# Patient Record
Sex: MALE | Race: WHITE | NOT HISPANIC OR LATINO
[De-identification: names, ages, dates, MRNs, and addresses within clinical notes are randomized per-mention and may not be internally consistent; named-entity substitution may affect disease eponyms.]

---

## 2020-03-06 ENCOUNTER — APPOINTMENT (OUTPATIENT)
Dept: FAMILY MEDICINE | Facility: CLINIC | Age: 20
End: 2020-03-06
Payer: COMMERCIAL

## 2020-03-06 VITALS
OXYGEN SATURATION: 98 % | HEIGHT: 71.5 IN | WEIGHT: 215 LBS | SYSTOLIC BLOOD PRESSURE: 120 MMHG | RESPIRATION RATE: 16 BRPM | TEMPERATURE: 98 F | BODY MASS INDEX: 29.44 KG/M2 | HEART RATE: 84 BPM | DIASTOLIC BLOOD PRESSURE: 82 MMHG

## 2020-03-06 DIAGNOSIS — L70.9 ACNE, UNSPECIFIED: ICD-10-CM

## 2020-03-06 DIAGNOSIS — Z00.00 ENCOUNTER FOR GENERAL ADULT MEDICAL EXAMINATION W/OUT ABNORMAL FINDINGS: ICD-10-CM

## 2020-03-06 DIAGNOSIS — Z84.0 FAMILY HISTORY OF DISEASES OF THE SKIN AND SUBCUTANEOUS TISSUE: ICD-10-CM

## 2020-03-06 DIAGNOSIS — Z80.9 FAMILY HISTORY OF MALIGNANT NEOPLASM, UNSPECIFIED: ICD-10-CM

## 2020-03-06 DIAGNOSIS — R94.5 ABNORMAL RESULTS OF LIVER FUNCTION STUDIES: ICD-10-CM

## 2020-03-06 DIAGNOSIS — Z87.39 PERSONAL HISTORY OF OTHER DISEASES OF THE MUSCULOSKELETAL SYSTEM AND CONNECTIVE TISSUE: ICD-10-CM

## 2020-03-06 DIAGNOSIS — Z82.61 FAMILY HISTORY OF ARTHRITIS: ICD-10-CM

## 2020-03-06 PROCEDURE — 99395 PREV VISIT EST AGE 18-39: CPT

## 2020-03-06 NOTE — PHYSICAL EXAM
[No Acute Distress] : no acute distress [Normal Sclera/Conjunctiva] : normal sclera/conjunctiva [Normal Oropharynx] : the oropharynx was normal [Normal TMs] : both tympanic membranes were normal [No Lymphadenopathy] : no lymphadenopathy [Thyroid Normal, No Nodules] : the thyroid was normal and there were no nodules present [No Accessory Muscle Use] : no accessory muscle use [Clear to Auscultation] : lungs were clear to auscultation bilaterally [Normal Rate] : normal rate  [Regular Rhythm] : with a regular rhythm [Normal S1, S2] : normal S1 and S2 [Soft] : abdomen soft [Non Tender] : non-tender [Non-distended] : non-distended [No HSM] : no HSM [Normal Supraclavicular Nodes] : no supraclavicular lymphadenopathy [Normal Posterior Cervical Nodes] : no posterior cervical lymphadenopathy [Normal Anterior Cervical Nodes] : no anterior cervical lymphadenopathy [No Spinal Tenderness] : no spinal tenderness [No Joint Swelling] : no joint swelling [Grossly Normal Strength/Tone] : grossly normal strength/tone [No Focal Deficits] : no focal deficits [Normal Gait] : normal gait [Deep Tendon Reflexes (DTR)] : deep tendon reflexes were 2+ and symmetric [Alert and Oriented x3] : oriented to person, place, and time [Normal Insight/Judgement] : insight and judgment were intact [de-identified] : calm and pleasant  [de-identified] : o [de-identified] : mild facial acne  NODULE  LEFT CHEEK  anticipating excison

## 2020-03-06 NOTE — HISTORY OF PRESENT ILLNESS
[FreeTextEntry1] : New patient here to establish care.\par Informed by dermatologist of elevated LFT's.\par Amoxicillin allergy\par [facility] [de-identified] : Mr. JIMMIE REYES presents today to establish care being referred to me by his his parents  also a patient  in [place] \par He is an affable 19 year old male with PMH significant for mildly elevated LE while taking  Acutane , month 2. \par \par PSH significant for  NONE \par \par Denies any recent ER visits/hospitalizations/ MVA's or MSK injuries. \par \par Providers:  Dermatologist  Dr. 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

## 2020-03-06 NOTE — REVIEW OF SYSTEMS
[Negative] : Psychiatric [Fever] : no fever [Chills] : no chills [Fatigue] : no fatigue [Night Sweats] : no night sweats [FreeTextEntry3] : eyeglasses

## 2020-03-06 NOTE — HEALTH RISK ASSESSMENT
[Good] : ~his/her~ current health as good [Very Good] : ~his/her~  mood as very good [Yes] : Yes [Monthly or less (1 pt)] : Monthly or less (1 point) [1 or 2 (0 pts)] : 1 or 2 (0 points) [Never (0 pts)] : Never (0 points) [No] : In the past 12 months have you used drugs other than those required for medical reasons? No [0] : 2) Feeling down, depressed, or hopeless: Not at all (0) [FreeTextEntry1] : Establish care and address elevated LFT's [] : No [de-identified] : DENIES [Audit-CScore] : 1 [[id number]] : 0

## 2020-03-09 ENCOUNTER — APPOINTMENT (OUTPATIENT)
Dept: ULTRASOUND IMAGING | Facility: CLINIC | Age: 20
End: 2020-03-09
Payer: COMMERCIAL

## 2020-03-09 PROCEDURE — 76700 US EXAM ABDOM COMPLETE: CPT

## 2021-02-12 ENCOUNTER — OUTPATIENT (OUTPATIENT)
Dept: OUTPATIENT SERVICES | Facility: HOSPITAL | Age: 21
LOS: 1 days | Discharge: HOME | End: 2021-02-12

## 2021-02-12 DIAGNOSIS — Z15.89 GENETIC SUSCEPTIBILITY TO OTHER DISEASE: ICD-10-CM

## 2021-02-16 ENCOUNTER — APPOINTMENT (OUTPATIENT)
Dept: HEMATOLOGY ONCOLOGY | Facility: CLINIC | Age: 21
End: 2021-02-16

## 2021-03-22 ENCOUNTER — OUTPATIENT (OUTPATIENT)
Dept: OUTPATIENT SERVICES | Facility: HOSPITAL | Age: 21
LOS: 1 days | Discharge: HOME | End: 2021-03-22

## 2021-03-22 DIAGNOSIS — Z15.89 GENETIC SUSCEPTIBILITY TO OTHER DISEASE: ICD-10-CM

## 2021-03-23 ENCOUNTER — APPOINTMENT (OUTPATIENT)
Dept: HEMATOLOGY ONCOLOGY | Facility: CLINIC | Age: 21
End: 2021-03-23
Payer: COMMERCIAL

## 2021-03-23 ENCOUNTER — APPOINTMENT (OUTPATIENT)
Dept: HEMATOLOGY ONCOLOGY | Facility: CLINIC | Age: 21
End: 2021-03-23

## 2021-03-23 PROCEDURE — 99204 OFFICE O/P NEW MOD 45 MIN: CPT | Mod: 95

## 2021-03-31 ENCOUNTER — APPOINTMENT (OUTPATIENT)
Dept: TRANSGENDER CARE | Facility: CLINIC | Age: 21
End: 2021-03-31

## 2021-03-31 ENCOUNTER — NON-APPOINTMENT (OUTPATIENT)
Age: 21
End: 2021-03-31

## 2021-03-31 ENCOUNTER — TRANSCRIPTION ENCOUNTER (OUTPATIENT)
Age: 21
End: 2021-03-31

## 2021-04-12 ENCOUNTER — APPOINTMENT (OUTPATIENT)
Dept: TRANSGENDER CARE | Facility: CLINIC | Age: 21
End: 2021-04-12
Payer: COMMERCIAL

## 2021-04-12 VITALS
TEMPERATURE: 98.1 F | SYSTOLIC BLOOD PRESSURE: 110 MMHG | BODY MASS INDEX: 25.9 KG/M2 | DIASTOLIC BLOOD PRESSURE: 68 MMHG | HEART RATE: 107 BPM | WEIGHT: 185 LBS | OXYGEN SATURATION: 98 % | HEIGHT: 71 IN

## 2021-04-12 DIAGNOSIS — Z11.59 ENCOUNTER FOR SCREENING FOR OTHER VIRAL DISEASES: ICD-10-CM

## 2021-04-12 DIAGNOSIS — Z11.3 ENCOUNTER FOR SCREENING FOR INFECTIONS WITH A PREDOMINANTLY SEXUAL MODE OF TRANSMISSION: ICD-10-CM

## 2021-04-12 DIAGNOSIS — Z11.4 ENCOUNTER FOR SCREENING FOR HUMAN IMMUNODEFICIENCY VIRUS [HIV]: ICD-10-CM

## 2021-04-12 DIAGNOSIS — Z78.9 OTHER SPECIFIED HEALTH STATUS: ICD-10-CM

## 2021-04-12 PROCEDURE — 36415 COLL VENOUS BLD VENIPUNCTURE: CPT

## 2021-04-12 PROCEDURE — 99205 OFFICE O/P NEW HI 60 MIN: CPT | Mod: 25

## 2021-04-12 PROCEDURE — 99072 ADDL SUPL MATRL&STAF TM PHE: CPT

## 2021-04-12 PROCEDURE — XXXXX: CPT

## 2021-04-12 RX ORDER — IBUPROFEN 800 MG/1
TABLET, FILM COATED ORAL
Refills: 0 | Status: ACTIVE | COMMUNITY

## 2021-04-12 RX ORDER — ISOTRETINOIN 30 MG/1
CAPSULE, GELATIN COATED ORAL
Refills: 0 | Status: DISCONTINUED | COMMUNITY
End: 2021-04-12

## 2021-04-12 RX ORDER — ELECTROLYTES/DEXTROSE
SOLUTION, ORAL ORAL
Refills: 0 | Status: ACTIVE | COMMUNITY

## 2021-04-12 NOTE — SOCIAL HISTORY
[Sexually Active] : The patient is sexually active [Always] : always [Vaginal] : vaginal [Female ___] : [unfilled] female [Date of most recent sexual encounter ___] : ~His/Her~ most recent sexual encounter was [unfilled] [Partnership for Health – Safe Sex Evidence Based Intervention] : The Partnership for Health Safe Sex Evidence Based Intervention was applied [Positive Reinforcement of Safe Behavior Message] : and a positive reinforcement of safe behavior message was provided.

## 2021-04-15 LAB
ALBUMIN SERPL ELPH-MCNC: 5.1 G/DL
ALP BLD-CCNC: 59 U/L
ALT SERPL-CCNC: 33 U/L
AMYLASE/CREAT SERPL: 72 U/L
ANION GAP SERPL CALC-SCNC: 12 MMOL/L
APPEARANCE: CLEAR
AST SERPL-CCNC: 20 U/L
BASOPHILS # BLD AUTO: 0.06 K/UL
BASOPHILS NFR BLD AUTO: 1.2 %
BILIRUB SERPL-MCNC: 0.4 MG/DL
BILIRUBIN URINE: NEGATIVE
BLOOD URINE: NEGATIVE
BUN SERPL-MCNC: 13 MG/DL
C TRACH RRNA SPEC QL NAA+PROBE: NOT DETECTED
CALCIUM SERPL-MCNC: 10.2 MG/DL
CHLORIDE SERPL-SCNC: 103 MMOL/L
CHOLEST SERPL-MCNC: 129 MG/DL
CO2 SERPL-SCNC: 27 MMOL/L
COLOR: NORMAL
CREAT SERPL-MCNC: 0.94 MG/DL
EOSINOPHIL # BLD AUTO: 0.25 K/UL
EOSINOPHIL NFR BLD AUTO: 5.1 %
ESTIMATED AVERAGE GLUCOSE: 103 MG/DL
ESTRADIOL SERPL-MCNC: 11 PG/ML
FSH SERPL-MCNC: 6.1 IU/L
GLUCOSE QUALITATIVE U: NEGATIVE
GLUCOSE SERPL-MCNC: 76 MG/DL
HBA1C MFR BLD HPLC: 5.2 %
HBV CORE IGG+IGM SER QL: NONREACTIVE
HBV SURFACE AG SER QL: NONREACTIVE
HCT VFR BLD CALC: 48.6 %
HCV AB SER QL: NONREACTIVE
HCV S/CO RATIO: 0.09 S/CO
HDLC SERPL-MCNC: 50 MG/DL
HEPATITIS A IGG ANTIBODY: NONREACTIVE
HGB BLD-MCNC: 14.8 G/DL
IMM GRANULOCYTES NFR BLD AUTO: 0.2 %
KETONES URINE: NEGATIVE
LDLC SERPL CALC-MCNC: 71 MG/DL
LEUKOCYTE ESTERASE URINE: NEGATIVE
LH SERPL-ACNC: 6.8 IU/L
LPL SERPL-CCNC: 27 U/L
LYMPHOCYTES # BLD AUTO: 1.7 K/UL
LYMPHOCYTES NFR BLD AUTO: 34.7 %
MAN DIFF?: NORMAL
MCHC RBC-ENTMCNC: 29.4 PG
MCHC RBC-ENTMCNC: 30.5 GM/DL
MCV RBC AUTO: 96.6 FL
MONOCYTES # BLD AUTO: 0.49 K/UL
MONOCYTES NFR BLD AUTO: 10 %
N GONORRHOEA RRNA SPEC QL NAA+PROBE: NOT DETECTED
NEUTROPHILS # BLD AUTO: 2.39 K/UL
NEUTROPHILS NFR BLD AUTO: 48.8 %
NITRITE URINE: NEGATIVE
NONHDLC SERPL-MCNC: 79 MG/DL
PH URINE: 6.5
PLATELET # BLD AUTO: 261 K/UL
POTASSIUM SERPL-SCNC: 4.7 MMOL/L
PROLACTIN SERPL-MCNC: 13.2 NG/ML
PROT SERPL-MCNC: 7.8 G/DL
PROTEIN URINE: NEGATIVE
PSA SERPL-MCNC: 1 NG/ML
RBC # BLD: 5.03 M/UL
RBC # FLD: 12.5 %
SHBG SERPL-SCNC: 22.5 NMOL/L
SODIUM SERPL-SCNC: 142 MMOL/L
SOURCE AMPLIFICATION: NORMAL
SPECIFIC GRAVITY URINE: 1.01
TESTOST SERPL-MCNC: 559 NG/DL
TRIGL SERPL-MCNC: 41 MG/DL
TSH SERPL-ACNC: 1.76 UIU/ML
UROBILINOGEN URINE: NORMAL
WBC # FLD AUTO: 4.9 K/UL

## 2021-04-26 LAB
25(OH)D3 SERPL-MCNC: 20.2 NG/ML
HBV SURFACE AB SERPL IA-ACNC: <3 MIU/ML

## 2021-05-27 ENCOUNTER — APPOINTMENT (OUTPATIENT)
Dept: TRANSGENDER CARE | Facility: CLINIC | Age: 21
End: 2021-05-27
Payer: COMMERCIAL

## 2021-05-27 VITALS
HEART RATE: 80 BPM | WEIGHT: 183 LBS | BODY MASS INDEX: 25.62 KG/M2 | TEMPERATURE: 98.2 F | DIASTOLIC BLOOD PRESSURE: 62 MMHG | SYSTOLIC BLOOD PRESSURE: 118 MMHG | RESPIRATION RATE: 14 BRPM | OXYGEN SATURATION: 99 % | HEIGHT: 71 IN

## 2021-05-27 PROCEDURE — 99072 ADDL SUPL MATRL&STAF TM PHE: CPT

## 2021-05-27 PROCEDURE — 99205 OFFICE O/P NEW HI 60 MIN: CPT

## 2021-05-27 NOTE — ASSESSMENT
[FreeTextEntry1] : 21 y/o transfemale here for initial hormone affirming transition consultation. Discussed the feminization process involving estrogen with spironolactone. Discussed physical changes such as breast growth, softer skin, body fat redistribution, less erections etc with timeline ranging from 3-6 months initial onset to maximal benefits in 1-2 years. Discussed potential risks of transaminitis, CAD and thromboembolic disease especially with tobacco use. Also need to monitor for risk of breast cancer given BRCA +. I discussed with the patient at length the limited data on risk of breast cancer with hormone affirming therapy especially with strong risk factors such as this. Pt. aware and consents to estrogen preferring oral estrogen. Will need to monitor BP, LFTs, and potassium on hormones. No plans for top or bottom surgery at this time. Psych and mental health follow-up recommended. Will need age-appropriate health care and cancer screening maintenance over the years with testicular exam, mammogram or US, colonoscopy, prostate exam. Pt. not interested in sperm banking. Will start estradiol 1mg BID and spironolactone 100mg BID with dose adjustment as needed based on levels in 2 months.\par \par Prep time with previsit notes, review of labs and interval progress notes and consultations 15 min\par Discussion with patient regarding new hormone regimen with management plan, risks and benefits, treatment options and goals of care answering all patients questions and addressing all concerns 35 min\par Post-visit completion, consultation, charting and review 10 min\par Total Time 60 min\par \par Specifically causes, evaluation, treatment options, risks, complications, and benefits of available therapies were discussed. Questions were answered.\par \par The submitted E/M billing level for this visit reflects the total time spent on the day of the visit including face-to-face time spent with the patient, non-face-to-face review of medical records and relevant information, documentation, and asynchronous communication with the patient after a visit via phone, email, or patient’s EHR portal after the visit. \par The medical records reviewed are either scanned into the chart or reviewed with the patient using a patient’s electronic medical records portal for patients with records not available to [facility] via electronic transmission platforms from other institutions and labs. \par Time spend counseling and performing coordination of care was also included in determining the appropriate EM billing level.\par \par I have reviewed and verified information regarding the chief complaint and history recorded by the ancillary staff and/or the patient. I have independently reviewed and interpreted tests performed by other physicians and facilities as necessary. \par \par I have discussed with the patient differential diagnosis, reason for auxiliary tests if ordered, risks, benefits, alternatives, and complications of each form of therapy were discussed. \par \par \par

## 2021-05-27 NOTE — REASON FOR VISIT
[Initial Evaluation] : an initial evaluation [Transgender Care] : transgender care [FreeTextEntry2] : Dr. Miguel Reeder

## 2021-05-27 NOTE — HISTORY OF PRESENT ILLNESS
[FreeTextEntry1] : 20 year old, transmale here for hormone affirming therapy consultation\par \par Preferred Pronouns: he/him (for now)\par Sexual orientation: heterosexual. \par Gender identity: \par Assigned male at birth\par Specific Terms for Body Parts:\par Call pt: Brian\par • Name Change + Gender Marker: Not a priority. Still exploring. \par Cleared for hormones by Pearl Perry\par \par \par Coming out/Family support:\par • Transition HX + Present Life: Brian tells having qualms about his assigned gender at birth since around 2010 but did not focus too much on it. In June/July 2020 he began seriously researching this issue more. He recalls, speaking to a therapist once about his gender identity a few year ago but it did not go beyond that. In 2020 he started participating in online trans forums and had an “epiphany” that he is female. He came out to his family and friends. His family (mom/step dad) is working on understanding but overall, he feels safe and supported at home. He lives with his mother, stepfather, and younger brother. He maintains a good relationship with his biological father, and he is supportive but he's still a bit confused. Pt has a good relationship with his brother and older sister (does not live home). He has food security and reliable transportation. \par \par \par • Mental Health: Reports anxiety and gender dysphoria. In care with Pearl Perry LCSW ([phone]) since early 2021. He attends weekly in person sessions. No psychiatric admissions. No psychiatric medications. No sleeping problems. No SI or plans to self-harm. No hx of violence. Not interested in virtual support resources. \par \par • Endocrinology: No hx of puberty blockers or gender affirming hormone therapy. Interested in full feminization. Mostly wants improved mood and body fat redistribution. No genital dysphoria. Has gender dysphoria with overall appearance. Breasts are important, as well as the physique and hair/makeup/outfit. Pt feels that estrogen will be very important to him.\par Not tucking\par Not interested in sperm banking.\par No erectile dysfunction. Not sexually active. \par No interest in gender affirming surgeries at this time. Maybe bottom surgery in the future. \par No tobacco use\par No headaches, changes in vision, nausea, vomiting, chest pain, SOB, palpitations, LE swelling or calf pain\par No hx of thromboembolic disease or liver disorders. \par Depression and anxiety stable. No suicidal ideations or plans. \par \par Genetic Counseling: Shared his mother had breast cancer and he inherited the BRCA2 gene. In care with a genetic counselor, Kranthi Izaguirre ([phone]), [facility], [facility]. \par Mom - Breast cancer age 53 y/o -

## 2021-05-27 NOTE — REVIEW OF SYSTEMS
[All other systems negative] : All other systems negative [Fatigue] : no fatigue [Recent Weight Gain (___ Lbs)] : no recent weight gain [Recent Weight Loss (___ Lbs)] : no recent weight loss [Visual Field Defect] : no visual field defect [Dysphagia] : no dysphagia [Dysphonia] : no dysphonia [Chest Pain] : no chest pain [Palpitations] : no palpitations [Shortness Of Breath] : no shortness of breath [Nausea] : no nausea [Vomiting] : no vomiting [Polyuria] : no polyuria [Joint Pain] : no joint pain [Headaches] : no headaches [Tremors] : no tremors [Cold Intolerance] : no cold intolerance [Heat Intolerance] : no heat intolerance

## 2021-05-27 NOTE — PHYSICAL EXAM
[Alert] : alert [Well Nourished] : well nourished [Healthy Appearance] : healthy appearance [No Acute Distress] : no acute distress [Well Developed] : well developed [No Proptosis] : no proptosis [Supple] : the neck was supple [Thyroid Not Enlarged] : the thyroid was not enlarged [No Thyroid Nodules] : no palpable thyroid nodules [No Respiratory Distress] : no respiratory distress [No Accessory Muscle Use] : no accessory muscle use [Normal Rate and Effort] : normal respiratory rate and effort [Clear to Auscultation] : lungs were clear to auscultation bilaterally [Normal S1, S2] : normal S1 and S2 [Normal Rate] : heart rate was normal [Regular Rhythm] : with a regular rhythm [No Edema] : no peripheral edema [Not Tender] : non-tender [Not Distended] : not distended [Soft] : abdomen soft [No Stigmata of Cushings Syndrome] : no stigmata of Cushings Syndrome [No Clubbing, Cyanosis] : no clubbing  or cyanosis of the fingernails [Normal Strength/Tone] : muscle strength and tone were normal [Oriented x3] : oriented to person, place, and time [Normal Affect] : the affect was normal [Normal Mood] : the mood was normal [Kyphosis] : no kyphosis present

## 2021-05-27 NOTE — CONSULT LETTER
[Dear  ___] : Dear ~CHRIS, [Consult Letter:] : I had the pleasure of evaluating your patient, [unfilled]. [Please see my note below.] : Please see my note below. [Consult Closing:] : Thank you very much for allowing me to participate in the care of this patient.  If you have any questions, please do not hesitate to contact me. [Sincerely,] : Sincerely, [FreeTextEntry3] : Tiburcio Stuart DO\par Division of Endocrinology\par [facility]\par [facility]\par  of Medicine\par [facility] [employer]\par Director of [facility] [FreeTextEntry2] : Dr. Miguel Reeder\par [address]\par [place] [id number]

## 2021-05-27 NOTE — DATA REVIEWED
[FreeTextEntry1] : Labs 4/2021:\par CBC normal\par A1c 5.2%\par Estradiol 11\par Testosterone 559\par SHBG 22.5\par FSH 6.1\par LH 6.8\par Prolactin 13.2\par CMP normal\par Lipid Profile at goal

## 2021-05-31 NOTE — ASSESSMENT
[FreeTextEntry1] : The visit was provided via telehealth using real-time 2-way audio visual technology. The patient, Brian Saleh, was located at home, [address] [place] [address], at the time of the visit. The provider, Kranthi Izaguirre, was located at the medical office located in [place], [place] at the time of the visit. The provider, Dr. Jaime Michelle, was located in [place], [place] at the time of the visit. The patient, Brian Saleh, and the providers participated in the telehealth encounter. Consent for telehealth services was given on 03/23/2021 by the patient, Brian Saleh.\par \par REASON FOR CONSULT\par Brian Saleh is a 20-year-old designated male at birth who was seen 03/23/2021 for a discussion regarding his BRCA2 positive genetic testing results related to hereditary cancer predisposition. Of note, Brian is planning to start transitioning to female (male-to-female) and his preferred pronouns currently are he/him. \par \par Brian was originally seen at [facility] on 02/16/2021 for hereditary cancer predisposition risk assessment. Brian has no personal history of cancer, however he has a family history of Hereditary Breast and Ovarian Cancer Syndrome (HBOC). Brian’s mother pursued genetic testing using [employer]s [employer] panel after being diagnosed with breast cancer and a pathogenic mutation was identified in BRCA2 ([id number]; [id number]) (08/28/2020). Brian decided to pursue genetic testing using BRCA2 single site analysis offered by [employer].\par \par TEST RESULTS: BRCA2 POSITIVE, Pathogenic ([id number]; [id number])\par \par RESULTS INTERPRETATION AND ASSESSMENT \par We informed Brian that he tested positive for the same pathogenic mutation in the BRCA2 gene as his mother. 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 HBOC is an autosomal-dominant inherited cancer predisposition syndrome. Brian was informed that cis-gender males and females with a pathogenic BRCA2 mutation have increased risks for the following: \par  \par FEMALE BREAST CANCER RISK  \par Lifetime risk to develop female breast cancer is estimated to be 61-77% compared to the general population risk of 12.9%.   \par   \par MALE BREAST CANCER RISK  \par Lifetime risk to develop male breast cancer is estimated to be up to 10% compared to the general population risk of <1%   \par \par OVARIAN CANCER RISK  \par Lifetime risk to develop ovarian cancer is estimated to be 11-25% compared to the general population risk of 1.2%. Of note, the risk for ovarian cancer by age 30 is <1% and by age 50 the risk is 1-3%. \par  \par PROSTATE CANCER RISK  \par Lifetime risk to develop prostate cancer is estimated to be up to 30% compared to the general population risk of 12.1%  \par  \par PANCREATIC CANCER RISK  \par Lifetime risk to develop pancreatic cancer is estimated to be up to 7% compared to the general population risk of 1.6%.   \par   \par MELANOMA RISK  \par Lifetime risk to develop melanoma is estimated to be up to 5% compared to the general population risk of 2.3%.   \par \par IMPLICATIONS FOR THE PATIENT:\par Some retrospective studies in transgender females without a known BRCA1 or BRCA2 mutation have not suggested an increased risk for breast cancer compared to cisgender women. In fact, it was found that the incidence of breast cancer in transgender females was comparable to that of cisgender males and lower than that of cisgender females. However, these studies do not address the impact of a BRCA1 or BRCA2 carrier status. There is one case report published in 2016 which noted a transgender female patient with a BRCA2 mutation who was diagnosed with breast cancer after 7 years of hormone therapy (Dian et al, 2016). Currently, there is limited data on if and by how much hormone therapy impacts breast cancer risk in transgender females with a BRCA2 mutation. Therefore, the following recommendations are based on the limited literature currently available for transgender females with a known BRCA2 mutation. Any changes in surveillance and management should be made in a shared decision-making setting with a multidisciplinary healthcare team. We therefore recommended Brian be seen at the [facility] to discuss his options in greater detail. \par \par Given Brian’s personal and current reported family history of cancer, and his BRCA2 positive genetic test results, the following screening guidelines and risk-reducing recommendations were discussed, but these are subject to change after his consult with the [facility]:\par \par BREAST:  \par - Given Brian has not started any gender affirming therapies, we briefly discussed the alternatives to hormone affirming therapy including surgical options. We emphasized the importance of meeting with Dr. Miguel Reeder at the Transgender Care clinic to further understand his options. Referral provided. \par - If hormone therapy is pursued, we discussed the option of following him using the cis-female BRCA2 guidelines just to be cautious. This includes undergoing high-risk breast screening via annual breast MRIs with contrast starting at age 25-29 and annual mammograms starting at age 30. We emphasized the importance of breast awareness.  \par  \par PROSTATE: \par - Since prostatectomy is not a routine gender affirming surgery, we recommended prostate cancer screening starting at age 40.  \par  \par MELANOMA: \par - No specific screening guidelines exist, however, general melanoma risk management is appropriate, such as a full body skin examination by a physician and minimizing UV exposure. Brian was encouraged to see a dermatologist annually. \par  \par PANCREATIC: \par - Screening may be considered for individuals with a pathogenic BRCA2 mutation and a family history of pancreatic cancer (first or second degree relative) beginning at age 50 (or 10 years younger than earliest diagnosis) in the setting of an experienced high-volume center, ideally under research conditions. \par - Given Brian’s age, BRCA2 mutation status, and current reported family history, screening is not recommended at this time.  \par  \par OTHER: \par - In the absence of other indications, Brian should practice age-appropriate cancer screening of other organ systems as recommended for the general population. \par  \par IMPLICATIONS FOR FAMILY MEMBERS: \par This mutation is inherited in an autosomal dominant pattern. We recommend the patient’s first-degree relatives, specifically his sister, pursue genetic counseling and genetic testing as there is a 50% chance she also has the same mutation. We also encouraged his maternal half-brother to pursue genetic counselling and testing in the future. Please note, we do not recommend genetic testing for children until they are over the age of 18. Brian was made aware that if any at-risk relatives wanted to pursue genetic testing any time in the future, we would be happy to see them and coordinate testing. If they are not local, they can locate a genetic counselor using the National Society of Genetic Counselors, Find a Genetic Counselor Tool (www.nsgc.org/findageneticcounselor). \par   \par REPRODUCTIVE IMPLICATIONS AND OPTIONS \par The risk of passing on this mutation to a future generation is 50%. Since the genetic mutation is known, pre-implantation genetic diagnosis (PGD) is possible. PGD and prenatal diagnosis were discussed briefly. Brian stated he has not had a discussion regarding fertility preservation and reproductive implications of his transition, and is not very interested. Regardless, we encouraged him to meet with the fertility preservation specialist at the [facility]. He was also provided the contact information for the reproductive genetic counselor and the [facility] to discuss the option of PGD in the future. \par  \par Brian was also informed that individuals with biallelic mutations in BRCA2 gene have been reported to have Fanconi-Anemia (FA). FA is an autosomal recessive condition characterized by physical abnormalities (i.e. short statures, skeletal malformations), bone marrow failure and increased risk for acute myeloid leukemia and other malignancies. For those of reproductive age, we recommend the partner of an individual who is a BRCA2 carrier also have BRCA2 genetic testing to assess the risk of having a child affected with this condition if it would inform reproductive decision making. If both individuals carry a single BRCA2 mutation, there is a up to 25% chance for each pregnancy to be affected with FA. \par  \par RESOURCES & SUPPORT GROUPS \par Facing Our Risk of cancer Empowered (FORCE): www.FacingOurRisk.org   \par [facility] [place]: www.BrightPink.org  \par [place]: www.sharsheret.org  \par   \par In addition, the oncology social workers at [facility] are available to assist with more referrals, if necessary. \par  \par PLAN: \par 1. See above note for recommended management. We highly recommended Brian contact the [facility] to discuss his transition plan and options. Contact information provided. \par 2. We encouraged sharing these results with family members as noted above. They have a risk to have inherited the same mutation. Other family may benefit from genetic testing and should contact a certified genetic counselor specializing in cancer. Due to HIPAA and [place] laws, Genetics is unable to directly contact other family at risk, but we are available should family members wish to reach out to us \par 3. Family support resources, copy of report and clinic note will be mailed to Brian.  \par 5. Genetic knowledge changes rapidly. We encouraged re-contacting Cancer [facility] every 2-3 years for any changes in screening recommendations or sooner if there are significant changes in personal or family history \par  \par For any additional questions please call [facility] at [phone].  \par  \par  \par Kranthi Izaguirre MS, [facility] \par Genetic Counselor, [facility] \par  \par \par Attending Attestation:\par \par I have reviewed and edited the genetic counselor's note and I agree with the assessment and plan as documented. I spent greater than 45 minutes in total time of which approximately 25 minutes was face-to-face (via 2-way audiovisual telemedicine connection) with Mr. Saleh reviewing his relevant personal and family history, the genetic testing results, our risk-assessment, and options for future cancer risk-reduction for the patient and his relevant family members. Over half this time was spent in counseling and coordination of care.\par \par Jaime Michelle MD\par Chief, Cancer Genetics\par [facility]\par \par  \par CC:  \par Patient

## 2021-07-14 NOTE — HISTORY OF PRESENT ILLNESS
[FreeTextEntry1] : BRIAN REYES is a 20 year old, transmale seen on 04/12/2021 for intial transgender care program intake visit.\par \par Preferred Pronouns: he/him (for now)\par Sexual orientation: heterosexual. \par Gender identity: \par Assigned male at birth\par Specific Terms for Body Parts:\par Call pt: Brian\par \par Transition history (Social, Endo, Surgical):\par \par Coming out/Family support:\par • Transition HX + Present Life: Brian tells having qualms about his assigned gender at birth for 10 years but did not focus too much on it.  In June/July 2020 he began seriously researching this issue more.  He recalls, speaking to a therapist once about his gender identity a few year ago but it did not go beyond that. A year ago, he started participating in online trans forums and had an “epiphany” that he is female. He came out to his family and friends. His family (mom/step dad) is working on understanding but overall, he feels safe and supported at home. He lives with his mother, stepfather, and younger brother. He maintains a good relationship with his biological father, and he is supportive but he's still a bit confused (and apprehention). Pt has a good relationship with his brother and older sister (does not live home). He has food security and reliable transportation. \par \par • Education | Employment: Some college 1.5 year. He is on unemployment. 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 In care with Pearl Perry LCSW ([phone]) for 2-3 months. He attends weekly in person sessions. No psychiatric admissions. No psychiatric medications. No sleeping problems. No SI or plans to self-harm. No hx of violence. Not interested in virtual support resources. \par \par Mental Health:\par Psychiatry:  no\par Psychology: Pearl Perry LCSW ([phone]) - weekly \par History of mental health admission:no\par History of Suicidal/homicidal ideation & Self harm:  Denies suicidal ideation, denies homicidal ideation. \par \par Hospitalization: asthma exacerbation as a child - few days stay\par \par PMHx: Asthma: has an MDI, rarely use it.   Continues to be active. \par \par • Endocrinology: No hx of puberty blockers or gender affirming hormone therapy.\par \par • Primary Care: In care with a Diana Ramachandran MD. Previous visit, December 2020. Reports immunizations are up to date. He did not receive a [id number] flu vaccine. Has been to another provider more recently.  \par \par Genetic Counseling: Shared his mother had breast cancer and he inherited the BRCA2 gene. In care with a genetic counselor, Kranthi Izaguirre ([phone]), [facility], Cancer Genetics. \par Mom - Breast cancer age 53 y/o - \par \par • Sexual Health: Right now, he identifies as heterosexual. He is single. He has never had an HIV/STI screening. Last had sex in 2016 with a cisfemale (kissing, touching, oral and vaginal sex). Protection method, condoms. He has never made someone else pregnant. He is not interested in Prep. No interest in dating right now. \par \par Health Screening: \par Last HIV test:  no\par Last STI tests and sites: no\par \par • Coping: Ways include playing video games and speaking to his family and friends. \par \par • Feelings of Gender Dysphoria/ Body Dysmorphia: Experiences daily gender dysphoria with overall appearance.   Pt is not inerested in anyitng one specific aspect of thigns.  Interested in a female presneation overall.  Breasts are important, as well as the phasique and hair/makeup/outfit.  Pt feels that estrogen will be very important to him.  The overall effects of estrogen are most important to him, including body fat resdistribution and mood effects as well as breast development and skin changes.     \par \par Derm: Not recent followup. 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  He has female clothes but rarely wears them due to lack of privacy in the home. \par \par • Gender Presentation: Presents male. He has never tucked. Generally, shaves facial and body hair but right now he has a beard. No desire for hair removal referrals. \par \par • Tattoos/ Piercing: Has ear piercings, done professionally. \par \par • Cigarette, Vaping, Marijuana, Alcohol, and Drug Use: Alcohol use, socially.  Once every few months. Usually 1-2 drinks.  \par \par • Reproductive Endocrinology: Expressed he is not interested in sperm banking. \par \par • Gender Affirming Surgery: Not a priority. \par \par • Name Change + Gender Marker: Not a priority. Still exploring. \par \par • Nutrition: No appetite, nutritional concerns or hx of eating disorders. He eats 3-4 meals a day and exercises 3x a week (gym). He is 5’11 and 185lbs.  Calorie counting.  No specific diet.  No vommitting or binging behaviors. \par \par Excerise: 3000 steps/day over the past 3 months\par \par \par Binding/Tucking:\par \par Goals of Trans care:\par 1)  Gender affimring homrone therapy\par 2)  Mental hleahtl clearance letter from current metnal health provider\par

## 2021-07-14 NOTE — DATA REVIEWED
[FreeTextEntry1] : BRCA2 [id number] del([id number]) Heterozyogus - HIGH irks: Pancreatic, Male Breast, Prostate Cacner, Elevated Risk: Melanoma.

## 2021-07-22 ENCOUNTER — APPOINTMENT (OUTPATIENT)
Dept: TRANSGENDER CARE | Facility: CLINIC | Age: 21
End: 2021-07-22
Payer: COMMERCIAL

## 2021-07-22 VITALS
HEART RATE: 80 BPM | OXYGEN SATURATION: 97 % | TEMPERATURE: 98.2 F | HEIGHT: 71 IN | WEIGHT: 183 LBS | BODY MASS INDEX: 25.62 KG/M2 | SYSTOLIC BLOOD PRESSURE: 110 MMHG | DIASTOLIC BLOOD PRESSURE: 74 MMHG

## 2021-07-22 PROCEDURE — 99072 ADDL SUPL MATRL&STAF TM PHE: CPT

## 2021-07-22 PROCEDURE — 99214 OFFICE O/P EST MOD 30 MIN: CPT

## 2021-07-22 RX ORDER — ESTRADIOL 1 MG/1
1 TABLET ORAL
Qty: 60 | Refills: 5 | Status: COMPLETED | COMMUNITY
Start: 2021-05-27 | End: 2021-07-22

## 2021-07-22 NOTE — REVIEW OF SYSTEMS
[Fatigue] : no fatigue [Recent Weight Gain (___ Lbs)] : no recent weight gain [Recent Weight Loss (___ Lbs)] : no recent weight loss [Visual Field Defect] : no visual field defect [Dysphagia] : no dysphagia [Dysphonia] : no dysphonia [Chest Pain] : no chest pain [Palpitations] : no palpitations [Shortness Of Breath] : no shortness of breath [Nausea] : no nausea [Vomiting] : no vomiting [Polyuria] : no polyuria [Joint Pain] : no joint pain [Headaches] : no headaches [Tremors] : no tremors [Cold Intolerance] : no cold intolerance [Heat Intolerance] : no heat intolerance [All other systems negative] : All other systems negative

## 2021-07-22 NOTE — ASSESSMENT
[FreeTextEntry1] : 21 y/o transfemale here for initial hormone affirming transition consultation. Discussed the feminization process involving estrogen with spironolactone. Discussed physical changes such as breast growth, softer skin, body fat redistribution, less erections etc with timeline ranging from 3-6 months initial onset to maximal benefits in 1-2 years. Discussed potential risks of transaminitis, CAD and thromboembolic disease especially with tobacco use. Also need to monitor for risk of breast cancer given BRCA +. I discussed with the patient at length the limited data on risk of breast cancer with hormone affirming therapy especially with strong risk factors such as this. Pt. aware and consents to estrogen preferring oral estrogen. Will need to monitor BP, LFTs, and potassium on hormones. No plans for top or bottom surgery at this time. Psych and mental health follow-up recommended. Will need age-appropriate health care and cancer screening maintenance over the years with testicular exam, mammogram or US, colonoscopy, prostate exam. Pt. not interested in sperm banking. Started estradiol 1mg BID and spironolactone 100mg BID. Pt. prefers injections. Recommend estradiol 0.2ml (4mg) weekly with dose adjustment as needed based on levels in 2 months.\par \par Prep time with previsit notes, review of labs and interval progress notes and consultations 5 min\par Discussion with patient regarding new hormone regimen with injectable estrogen with management plan, risks and benefits, treatment options and goals of care answering all patients questions and addressing all concerns 20 min\par Post-visit completion, consultation, charting and review 5 min\par Total Time 30 min\par \par Specifically causes, evaluation, treatment options, risks, complications, and benefits of available therapies were discussed. Questions were answered.\par \par The submitted E/M billing level for this visit reflects the total time spent on the day of the visit including face-to-face time spent with the patient, non-face-to-face review of medical records and relevant information, documentation, and asynchronous communication with the patient after a visit via phone, email, or patient’s EHR portal after the visit. \par The medical records reviewed are either scanned into the chart or reviewed with the patient using a patient’s electronic medical records portal for patients with records not available to [facility] via electronic transmission platforms from other institutions and labs. \par Time spend counseling and performing coordination of care was also included in determining the appropriate EM billing level.\par \par I have reviewed and verified information regarding the chief complaint and history recorded by the ancillary staff and/or the patient. I have independently reviewed and interpreted tests performed by other physicians and facilities as necessary. \par \par I have discussed with the patient differential diagnosis, reason for auxiliary tests if ordered, risks, benefits, alternatives, and complications of each form of therapy were discussed. \par \par \par

## 2021-07-22 NOTE — PHYSICAL EXAM
[Alert] : alert [Well Nourished] : well nourished [Healthy Appearance] : healthy appearance [No Acute Distress] : no acute distress [Well Developed] : well developed [No Proptosis] : no proptosis [Supple] : the neck was supple [Thyroid Not Enlarged] : the thyroid was not enlarged [No Thyroid Nodules] : no palpable thyroid nodules [No Respiratory Distress] : no respiratory distress [No Accessory Muscle Use] : no accessory muscle use [Normal Rate and Effort] : normal respiratory rate and effort [Clear to Auscultation] : lungs were clear to auscultation bilaterally [Normal S1, S2] : normal S1 and S2 [Normal Rate] : heart rate was normal [Regular Rhythm] : with a regular rhythm [No Edema] : no peripheral edema [Not Tender] : non-tender [Not Distended] : not distended [Soft] : abdomen soft [Kyphosis] : no kyphosis present [No Stigmata of Cushings Syndrome] : no stigmata of Cushings Syndrome [No Clubbing, Cyanosis] : no clubbing  or cyanosis of the fingernails [Normal Strength/Tone] : muscle strength and tone were normal [Oriented x3] : oriented to person, place, and time [Normal Affect] : the affect was normal [Normal Mood] : the mood was normal

## 2021-07-22 NOTE — HISTORY OF PRESENT ILLNESS
[FreeTextEntry1] : 20 year old, transmale here for hormone affirming therapy consultation\par \par Preferred Pronouns: he/him (for now)\par Sexual orientation: heterosexual. \par Gender identity: \par Assigned male at birth\par Specific Terms for Body Parts:\par Call pt: Brian\par • Name Change + Gender Marker: Not a priority. Still exploring. \par Cleared for hormones by Pearl Perry\par \par \par Coming out/Family support:\par • Transition HX + Present Life: Brian tells having qualms about his assigned gender at birth since around 2010 but did not focus too much on it. In June/July 2020 he began seriously researching this issue more. He recalls, speaking to a therapist once about his gender identity a few year ago but it did not go beyond that. In 2020 he started participating in online trans forums and had an “epiphany” that he is female. He came out to his family and friends. His family (mom/step dad) is working on understanding but overall, he feels safe and supported at home. He lives with his mother, stepfather, and younger brother. He maintains a good relationship with his biological father, and he is supportive but he's still a bit confused. Pt has a good relationship with his brother and older sister (does not live home). He has food security and reliable transportation. \par \par \par • Mental Health: Reports anxiety and gender dysphoria. In care with Pearl Perry LCSW ([phone]) since early 2021. He attends weekly in person sessions. No psychiatric admissions. No psychiatric medications. No sleeping problems. No SI or plans to self-harm. No hx of violence. Not interested in virtual support resources. \par \par • Endocrinology: Started on gender affirming hormone therapy 5/2021. Recommended estradiol 1mg BID and spironolactone 100mg BID. Since starting hormone shas noticed less spontaneous erections, softer skin, some chest/nipple tenderness. Interested in full feminization. Mostly wants improved mood and body fat redistribution. No genital dysphoria. Has gender dysphoria with overall appearance. Breasts are important, as well as the physique and hair/makeup/outfit. Pt feels that estrogen will be very important to him.\par Not tucking\par Not interested in sperm banking.\par No erectile dysfunction. Not sexually active. \par No interest in gender affirming surgeries at this time. Maybe bottom surgery in the future. \par No tobacco use\par No headaches, changes in vision, nausea, vomiting, chest pain, SOB, palpitations, LE swelling or calf pain\par No hx of thromboembolic disease or liver disorders. \par Depression and anxiety stable. No suicidal ideations or plans. \par \par Genetic Counseling: Shared his mother had breast cancer and he inherited the BRCA2 gene. In care with a genetic counselor, Kranthi Izaguirre ([phone]), [facility], Cancer Genetics. \par Mom - Breast cancer age 53 y/o -

## 2021-09-09 ENCOUNTER — APPOINTMENT (OUTPATIENT)
Dept: TRANSGENDER CARE | Facility: CLINIC | Age: 21
End: 2021-09-09
Payer: COMMERCIAL

## 2021-09-09 VITALS
BODY MASS INDEX: 25.06 KG/M2 | HEART RATE: 121 BPM | SYSTOLIC BLOOD PRESSURE: 90 MMHG | HEIGHT: 71 IN | OXYGEN SATURATION: 97 % | TEMPERATURE: 98 F | WEIGHT: 179 LBS | DIASTOLIC BLOOD PRESSURE: 60 MMHG

## 2021-09-09 PROCEDURE — 99214 OFFICE O/P EST MOD 30 MIN: CPT

## 2021-09-09 NOTE — REVIEW OF SYSTEMS
[Fatigue] : no fatigue [Recent Weight Gain (___ Lbs)] : no recent weight gain [Recent Weight Loss (___ Lbs)] : no recent weight loss [Visual Field Defect] : no visual field defect [Dysphagia] : no dysphagia [Dysphonia] : no dysphonia [Chest Pain] : no chest pain [Palpitations] : no palpitations [Shortness Of Breath] : no shortness of breath [Nausea] : no nausea [Vomiting] : no vomiting [Polyuria] : no polyuria [Headaches] : no headaches [Joint Pain] : no joint pain [Tremors] : no tremors [Cold Intolerance] : no cold intolerance [Heat Intolerance] : no heat intolerance [All other systems negative] : All other systems negative

## 2021-09-09 NOTE — HISTORY OF PRESENT ILLNESS
[FreeTextEntry1] : 20 year old, transmale here for hormone affirming therapy consultation\par \par Preferred Pronouns: he/him (for now)\par Sexual orientation: heterosexual. \par Gender identity: \par Assigned male at birth\par Specific Terms for Body Parts:\par Call pt: Brian\par • Name Change + Gender Marker: Not a priority. Still exploring. \par Cleared for hormones by Pearl Perry\par \par \par Coming out/Family support:\par • Transition HX + Present Life: Brian tells having qualms about his assigned gender at birth since around 2010 but did not focus too much on it. In June/July 2020 he began seriously researching this issue more. He recalls, speaking to a therapist once about his gender identity a few year ago but it did not go beyond that. In 2020 he started participating in online trans forums and had an “epiphany” that he is female. He came out to his family and friends. His family (mom/step dad) is working on understanding but overall, he feels safe and supported at home. He lives with his mother, stepfather, and younger brother. He maintains a good relationship with his biological father, and he is supportive but he's still a bit confused. Pt has a good relationship with his brother and older sister (does not live home). He has food security and reliable transportation. \par \par \par • Mental Health: Reports anxiety and gender dysphoria. In care with Pearl Perry LCSW ([phone]) since early 2021. He attends weekly in person sessions. No psychiatric admissions. No psychiatric medications. No sleeping problems. No SI or plans to self-harm. No hx of violence. Not interested in virtual support resources. \par \par • Endocrinology: Started on gender affirming hormone therapy 5/2021. Recommended estradiol 1mg BID and spironolactone 100mg BID. Changed to injectable estrogen 7/2021 as level remained suboptimal and patient was not happy with progression of feminization. Currently on 0.2ml of 20mg/ml every week on Fridays. Last dose 6 days ago. Since starting hormone has noticed less spontaneous erections, softer skin, some chest/nipple tenderness. Interested in full feminization. Mostly wants improved mood and body fat redistribution. No genital dysphoria. Has gender dysphoria with overall appearance. Breasts are important, as well as the physique and hair/makeup/outfit. Pt feels that estrogen will be very important to him.\par Not tucking\par Not interested in sperm banking.\par No erectile dysfunction. Not sexually active. \par No interest in gender affirming surgeries at this time. Maybe bottom surgery in the future. \par No tobacco use\par No headaches, changes in vision, nausea, vomiting, chest pain, SOB, palpitations, LE swelling or calf pain\par No hx of thromboembolic disease or liver disorders. \par Depression and anxiety stable. No suicidal ideations or plans. \par \par Genetic Counseling: Shared his mother had breast cancer and he inherited the BRCA2 gene. In care with a genetic counselor, Kranthi Izaguirre ([phone]), [facility], Cancer Genetics. \par Mom - Breast cancer age 53 y/o -

## 2021-09-09 NOTE — ASSESSMENT
[FreeTextEntry1] : 19 y/o transfemale here for follow-up hormone affirming transition therapy. Discussed the feminization process involving estrogen with spironolactone. Discussed physical changes such as breast growth, softer skin, body fat redistribution, less erections etc with timeline ranging from 3-6 months initial onset to maximal benefits in 1-2 years. Discussed potential risks of transaminitis, CAD and thromboembolic disease especially with tobacco use. Also need to monitor for risk of breast cancer given BRCA +. I discussed with the patient at length the limited data on risk of breast cancer with hormone affirming therapy especially with strong risk factors such as this. Pt. aware and consents to estrogen. Will need to monitor BP, LFTs, and potassium on hormones. No plans for top or bottom surgery at this time. Psych and mental health follow-up recommended. Will need age-appropriate health care and cancer screening maintenance over the years with testicular exam, mammogram or US, colonoscopy, prostate exam. Pt. not interested in sperm banking. Started estradiol 1 mg BID and spironolactone 100 mg BID. Now on estradiol 0.2 ml (4 mg) weekly and spironolactone 100 mg BID with dose adjustment as needed based on levels today. \par \par Prep time with previsit notes, review of labs and interval progress notes and consultations 5 min\par Discussion with patient regarding new hormone regimen with injectable estrogen with management plan, risks and benefits, treatment options and goals of care answering all patients questions and addressing all concerns 20 min\par Post-visit completion, consultation, charting and review 5 min\par Total Time 30 min\par \par Specifically causes, evaluation, treatment options, risks, complications, and benefits of available therapies were discussed. Questions were answered.\par \par The submitted E/M billing level for this visit reflects the total time spent on the day of the visit including face-to-face time spent with the patient, non-face-to-face review of medical records and relevant information, documentation, and asynchronous communication with the patient after a visit via phone, email, or patient’s EHR portal after the visit. \par The medical records reviewed are either scanned into the chart or reviewed with the patient using a patient’s electronic medical records portal for patients with records not available to [facility] via electronic transmission platforms from other institutions and labs. \par Time spend counseling and performing coordination of care was also included in determining the appropriate EM billing level.\par \par I have reviewed and verified information regarding the chief complaint and history recorded by the ancillary staff and/or the patient. I have independently reviewed and interpreted tests performed by other physicians and facilities as necessary. \par \par I have discussed with the patient differential diagnosis, reason for auxiliary tests if ordered, risks, benefits, alternatives, and complications of each form of therapy were discussed.

## 2021-09-10 LAB
ALBUMIN SERPL ELPH-MCNC: 4.9 G/DL
ALP BLD-CCNC: 48 U/L
ALT SERPL-CCNC: 49 U/L
ANION GAP SERPL CALC-SCNC: 11 MMOL/L
AST SERPL-CCNC: 25 U/L
BILIRUB SERPL-MCNC: 0.5 MG/DL
BUN SERPL-MCNC: 9 MG/DL
CALCIUM SERPL-MCNC: 9.9 MG/DL
CHLORIDE SERPL-SCNC: 102 MMOL/L
CHOLEST SERPL-MCNC: 125 MG/DL
CO2 SERPL-SCNC: 24 MMOL/L
CREAT SERPL-MCNC: 0.81 MG/DL
ESTRADIOL SERPL-MCNC: 77 PG/ML
GLUCOSE SERPL-MCNC: 87 MG/DL
HDLC SERPL-MCNC: 48 MG/DL
LDLC SERPL CALC-MCNC: 63 MG/DL
NONHDLC SERPL-MCNC: 77 MG/DL
POTASSIUM SERPL-SCNC: 4.6 MMOL/L
PROLACTIN SERPL-MCNC: 14 NG/ML
PROT SERPL-MCNC: 7.1 G/DL
SODIUM SERPL-SCNC: 137 MMOL/L
TESTOST SERPL-MCNC: 9.4 NG/DL
TRIGL SERPL-MCNC: 68 MG/DL

## 2021-09-14 LAB — SHBG SERPL-SCNC: 24.6 NMOL/L

## 2021-09-15 LAB
MONOMERIC PROLACTIN (ICMA)*: 8.72 NG/ML
PERCENT MACROPROLACTIN: 42 %
PROLACTIN, SERUM (ICMA)*: 15 NG/ML

## 2021-09-20 ENCOUNTER — RX RENEWAL (OUTPATIENT)
Age: 21
End: 2021-09-20

## 2021-11-11 ENCOUNTER — APPOINTMENT (OUTPATIENT)
Dept: TRANSGENDER CARE | Facility: CLINIC | Age: 21
End: 2021-11-11
Payer: COMMERCIAL

## 2021-11-11 VITALS
WEIGHT: 167 LBS | HEIGHT: 71 IN | HEART RATE: 71 BPM | OXYGEN SATURATION: 98 % | BODY MASS INDEX: 23.38 KG/M2 | DIASTOLIC BLOOD PRESSURE: 62 MMHG | SYSTOLIC BLOOD PRESSURE: 112 MMHG | TEMPERATURE: 98.1 F

## 2021-11-11 PROCEDURE — 99214 OFFICE O/P EST MOD 30 MIN: CPT

## 2021-11-11 NOTE — DATA REVIEWED
[FreeTextEntry1] : Labs 9/2021:\par Testosterone 9.4\par Estradiol 77\par Prolactin 14\par Lipid Profile normal\par \par Labs 4/2021:\par CBC normal\par A1c 5.2%\par Estradiol 11\par Testosterone 559\par SHBG 22.5\par FSH 6.1\par LH 6.8\par Prolactin 13.2\par CMP normal\par Lipid Profile at goal

## 2021-11-11 NOTE — ASSESSMENT
[FreeTextEntry1] : 19 y/o transfemale here for follow-up hormone affirming transition therapy. Discussed the feminization process involving estrogen with spironolactone. Discussed physical changes such as breast growth, softer skin, body fat redistribution, less erections etc with timeline ranging from 3-6 months initial onset to maximal benefits in 1-2 years. Discussed potential risks of transaminitis, CAD and thromboembolic disease especially with tobacco use. Also need to monitor for risk of breast cancer given BRCA +. I discussed with the patient at length the limited data on risk of breast cancer with hormone affirming therapy especially with strong risk factors such as this. Pt. aware and consents to estrogen. Will need to monitor BP, LFTs, and potassium on hormones. No plans for top or bottom surgery at this time. Psych and mental health follow-up recommended. Will need age-appropriate health care and cancer screening maintenance over the years with testicular exam, mammogram or US, colonoscopy, prostate exam. Pt. not interested in sperm banking. Started estradiol 1 mg BID and spironolactone 100 mg BID. Now on estradiol 0.2 ml (4 mg) IM weekly and spironolactone 100 mg BID with dose adjustment as needed based on levels today. \par \par Prep time with previsit notes, review of labs and interval progress notes and consultations 5 min\par Discussion with patient regarding new hormone regimen with injectable estrogen with management plan, risks and benefits, treatment options and goals of care answering all patients questions and addressing all concerns 20 min\par Post-visit completion, consultation, charting and review 5 min\par Total Time 30 min\par \par Specifically causes, evaluation, treatment options, risks, complications, and benefits of available therapies were discussed. Questions were answered.\par \par The submitted E/M billing level for this visit reflects the total time spent on the day of the visit including face-to-face time spent with the patient, non-face-to-face review of medical records and relevant information, documentation, and asynchronous communication with the patient after a visit via phone, email, or patient’s EHR portal after the visit. \par The medical records reviewed are either scanned into the chart or reviewed with the patient using a patient’s electronic medical records portal for patients with records not available to [facility] via electronic transmission platforms from other institutions and labs. \par Time spend counseling and performing coordination of care was also included in determining the appropriate EM billing level.\par \par I have reviewed and verified information regarding the chief complaint and history recorded by the ancillary staff and/or the patient. I have independently reviewed and interpreted tests performed by other physicians and facilities as necessary. \par \par I have discussed with the patient differential diagnosis, reason for auxiliary tests if ordered, risks, benefits, alternatives, and complications of each form of therapy were discussed.

## 2021-11-11 NOTE — HISTORY OF PRESENT ILLNESS
[FreeTextEntry1] : 21 year old, transmale here for hormone affirming therapy\par \par Preferred Pronouns: he/him (for now)\par Sexual orientation: heterosexual. \par Gender identity: \par Assigned male at birth\par Specific Terms for Body Parts:\par Call pt: Brian\par • Name Change + Gender Marker: Not a priority. Still exploring. \par Cleared for hormones by [facility]\par \par \par Coming out/Family support:\par • Transition HX + Present Life: Brian tells having qualms about his assigned gender at birth since around 2010 but did not focus too much on it. In June/July 2020 he began seriously researching this issue more. He recalls, speaking to a therapist once about his gender identity a few year ago but it did not go beyond that. In 2020 he started participating in online trans forums and had an “epiphany” that he is female. He came out to his family and friends. His family (mom/step dad) is working on understanding but overall, he feels safe and supported at home. He lives with his mother, stepfather, and younger brother. He maintains a good relationship with his biological father, and he is supportive but he's still a bit confused. Pt has a good relationship with his brother and older sister (does not live home). He has food security and reliable transportation. \par \par \par • Mental Health: Reports anxiety and gender dysphoria. In care with Pearl Perry LCSW ([phone]) since early 2021. He attends weekly in person sessions. No psychiatric admissions. No psychiatric medications. No sleeping problems. No SI or plans to self-harm. No hx of violence. Not interested in virtual support resources. \par \par • Endocrinology: Started on gender affirming hormone therapy 5/2021. Recommended estradiol 1mg BID and spironolactone 100mg BID. Changed to injectable estrogen 7/2021 as level remained suboptimal and patient was not happy with progression of feminization. Currently on 0.2ml of 20mg/ml every week on Fridays. Last dose 6 days ago. Since starting hormone has noticed less spontaneous erections, softer skin, some chest/nipple tenderness. Interested in full feminization. Mostly wants improved mood and body fat redistribution. No genital dysphoria. Has gender dysphoria with overall appearance. Breasts are important, as well as the physique and hair/makeup/outfit. Pt feels that estrogen will be very important to him.\par Not tucking\par Not interested in sperm banking.\par No erectile dysfunction. Not sexually active. \par No interest in gender affirming surgeries at this time. Maybe bottom surgery in the future. \par No tobacco use\par No headaches, changes in vision, nausea, vomiting, chest pain, SOB, palpitations, LE swelling or calf pain\par No hx of thromboembolic disease or liver disorders. \par Depression and anxiety stable. No suicidal ideations or plans. \par \par Genetic Counseling: Shared his mother had breast cancer and he inherited the BRCA2 gene. In care with a genetic counselor, Kranthi Izaguirre ([phone]), [facility], Cancer Genetics. \par Mom - Breast cancer age 53 y/o -

## 2021-11-11 NOTE — REVIEW OF SYSTEMS
[All other systems negative] : All other systems negative [Fatigue] : no fatigue [Recent Weight Gain (___ Lbs)] : no recent weight gain [Recent Weight Loss (___ Lbs)] : recent weight loss: [unfilled] lbs [Visual Field Defect] : no visual field defect [Dysphagia] : no dysphagia [Dysphonia] : no dysphonia [Chest Pain] : no chest pain [Palpitations] : no palpitations [Shortness Of Breath] : no shortness of breath [Nausea] : no nausea [Vomiting] : no vomiting [Polyuria] : no polyuria [Joint Pain] : no joint pain [Headaches] : no headaches [Tremors] : no tremors [Cold Intolerance] : no cold intolerance [Heat Intolerance] : no heat intolerance

## 2021-11-12 LAB
ALBUMIN SERPL ELPH-MCNC: 4.8 G/DL
ALP BLD-CCNC: 38 U/L
ALT SERPL-CCNC: 29 U/L
ANION GAP SERPL CALC-SCNC: 12 MMOL/L
AST SERPL-CCNC: 20 U/L
BILIRUB SERPL-MCNC: 0.5 MG/DL
BUN SERPL-MCNC: 9 MG/DL
CALCIUM SERPL-MCNC: 9.7 MG/DL
CHLORIDE SERPL-SCNC: 103 MMOL/L
CO2 SERPL-SCNC: 23 MMOL/L
CREAT SERPL-MCNC: 0.76 MG/DL
ESTRADIOL SERPL-MCNC: 114 PG/ML
GLUCOSE SERPL-MCNC: 83 MG/DL
POTASSIUM SERPL-SCNC: 4.1 MMOL/L
PROT SERPL-MCNC: 7.1 G/DL
SODIUM SERPL-SCNC: 138 MMOL/L
TESTOST SERPL-MCNC: 9.6 NG/DL

## 2021-11-18 ENCOUNTER — RX RENEWAL (OUTPATIENT)
Age: 21
End: 2021-11-18

## 2022-03-07 ENCOUNTER — RX RENEWAL (OUTPATIENT)
Age: 22
End: 2022-03-07

## 2022-04-26 ENCOUNTER — APPOINTMENT (OUTPATIENT)
Dept: TRANSGENDER CARE | Facility: CLINIC | Age: 22
End: 2022-04-26
Payer: COMMERCIAL

## 2022-04-26 VITALS
WEIGHT: 180 LBS | HEIGHT: 71 IN | TEMPERATURE: 98.2 F | OXYGEN SATURATION: 99 % | SYSTOLIC BLOOD PRESSURE: 108 MMHG | HEART RATE: 92 BPM | BODY MASS INDEX: 25.2 KG/M2 | DIASTOLIC BLOOD PRESSURE: 60 MMHG

## 2022-04-26 PROCEDURE — 99214 OFFICE O/P EST MOD 30 MIN: CPT

## 2022-04-26 NOTE — REVIEW OF SYSTEMS
[All other systems negative] : All other systems negative [Recent Weight Gain (___ Lbs)] : recent weight gain: [unfilled] lbs [Fatigue] : no fatigue [Recent Weight Loss (___ Lbs)] : no recent weight loss [Visual Field Defect] : no visual field defect [Dysphagia] : no dysphagia [Dysphonia] : no dysphonia [Chest Pain] : no chest pain [Palpitations] : no palpitations [Shortness Of Breath] : no shortness of breath [Nausea] : no nausea [Vomiting] : no vomiting [Polyuria] : no polyuria [Joint Pain] : no joint pain [Headaches] : no headaches [Tremors] : no tremors [Cold Intolerance] : no cold intolerance [Heat Intolerance] : no heat intolerance

## 2022-04-26 NOTE — DATA REVIEWED
[FreeTextEntry1] : Labs 11/2021:\par Estradiol 114\par Testosterone 9.6\par CMP normal\par \par Labs 9/2021:\par Testosterone 9.4\par Estradiol 77\par Prolactin 14\par Lipid Profile normal\par \par Labs 4/2021:\par CBC normal\par A1c 5.2%\par Estradiol 11\par Testosterone 559\par SHBG 22.5\par FSH 6.1\par LH 6.8\par Prolactin 13.2\par CMP normal\par Lipid Profile at goal

## 2022-04-26 NOTE — HISTORY OF PRESENT ILLNESS
[FreeTextEntry1] : 21 year old, transmale here for hormone affirming therapy\par \par Preferred Pronouns: she/her\par Sexual orientation: heterosexual. \par Gender identity: \par Assigned male at birth\par Call pt: Brian\par • Name Change + Gender Marker: Not a priority. Still exploring. \par Cleared for hormones by Pearl Perry\par \par \par Coming out/Family support:\par • Transition HX + Present Life: Brian tells having qualms about his assigned gender at birth since around 2010 but did not focus too much on it. In June/July 2020 he began seriously researching this issue more. He recalls, speaking to a therapist once about his gender identity, but it did not go beyond that. In 2020 he started participating in online trans forums and had an “epiphany” that he is female. He came out to his family and friends. His family (mom/step dad) is working on understanding but overall, he feels safe and supported at home. He lives with his mother, stepfather, and younger brother. He maintains a good relationship with his biological father, and he is supportive but he's still a bit confused. Pt has a good relationship with his brother and older sister (does not live home). \par \par \par • Mental Health: Reports anxiety and gender dysphoria. [phone] Now seeing new therapist. He attends weekly in person sessions. No psychiatric admissions. No psychiatric medications. No sleeping problems. No SI or plans to self-harm. No hx of violence. Not interested in virtual support resources. \par \par • Endocrinology: Started on gender affirming hormone therapy 5/2021. Recommended estradiol 1mg BID and spironolactone 100mg BID. Changed to injectable estrogen 7/2021 as level remained suboptimal and patient was not happy with progression of feminization. Currently on 0.2ml of 20mg/ml every week on Fridays. Last dose 4 days ago. Since starting hormone has noticed less spontaneous erections, softer skin, some chest/nipple tenderness. Interested in full feminization. Mostly wants improved mood and body fat redistribution. No genital dysphoria. Has gender dysphoria with overall appearance. Breasts are important, as well as the physique and hair/makeup/outfit. Pt feels that estrogen will be very important to him.\par Not tucking\par Not interested in sperm banking.\par No erectile dysfunction. Not sexually active. \par No interest in gender affirming surgeries at this time. Maybe bottom surgery in the future. \par No tobacco use\par No headaches, changes in vision, nausea, vomiting, chest pain, SOB, palpitations, LE swelling or calf pain\par No hx of thromboembolic disease or liver disorders. \par Depression and anxiety stable. No suicidal ideations or plans. \par \par Genetic Counseling: Shared his mother had breast cancer and he inherited the BRCA2 gene. In care with a genetic counselor, Kranthi Izaguirre ([phone]), [facility], Cancer Genetics. \par Mom - Breast cancer age 53 y/o -

## 2022-04-26 NOTE — ASSESSMENT
[FreeTextEntry1] : 20 y/o transfemale here for follow-up hormone affirming transition therapy. Discussed the feminization process involving estrogen with spironolactone. Discussed physical changes such as breast growth, softer skin, body fat redistribution, less erections etc with timeline ranging from 3-6 months initial onset to maximal benefits in 1-2 years. Discussed potential risks of transaminitis, CAD and thromboembolic disease especially with tobacco use. Also need to monitor for risk of breast cancer given BRCA +. I discussed with the patient at length the limited data on risk of breast cancer with hormone affirming therapy especially with strong risk factors such as this. Pt. aware and consents to estrogen. Will need to monitor BP, LFTs, and potassium on hormones. No plans for top or bottom surgery at this time. Psych and mental health follow-up recommended. Will need age-appropriate health care and cancer screening maintenance over the years with testicular exam, mammogram or US, colonoscopy, prostate exam. Pt. not interested in sperm banking. Started estradiol 1 mg BID and spironolactone 100 mg BID. Now on estradiol 0.2 ml (4 mg) IM weekly and spironolactone 100 mg BID with dose adjustment as needed based on levels today. \par \par Prep time with previsit notes, review of labs and interval progress notes and consultations \par Discussion with patient regarding new hormone regimen with injectable estrogen with management plan, risks and benefits, treatment options and goals of care answering all patients questions and addressing all concerns \par Post-visit completion, consultation, charting and review \par Total Time 30 min\par \par Specifically causes, evaluation, treatment options, risks, complications, and benefits of available therapies were discussed. Questions were answered.\par \par The submitted E/M billing level for this visit reflects the total time spent on the day of the visit including face-to-face time spent with the patient, non-face-to-face review of medical records and relevant information, documentation, and asynchronous communication with the patient after a visit via phone, email, or patient’s EHR portal after the visit. \par The medical records reviewed are either scanned into the chart or reviewed with the patient using a patient’s electronic medical records portal for patients with records not available to [facility] via electronic transmission platforms from other institutions and labs. \par Time spend counseling and performing coordination of care was also included in determining the appropriate EM billing level.\par \par I have reviewed and verified information regarding the chief complaint and history recorded by the ancillary staff and/or the patient. I have independently reviewed and interpreted tests performed by other physicians and facilities as necessary. \par \par I have discussed with the patient differential diagnosis, reason for auxiliary tests if ordered, risks, benefits, alternatives, and complications of each form of therapy were discussed.

## 2022-04-27 ENCOUNTER — TRANSCRIPTION ENCOUNTER (OUTPATIENT)
Age: 22
End: 2022-04-27

## 2022-04-27 LAB
ALBUMIN SERPL ELPH-MCNC: 4.8 G/DL
ALP BLD-CCNC: 33 U/L
ALT SERPL-CCNC: 24 U/L
ANION GAP SERPL CALC-SCNC: 10 MMOL/L
AST SERPL-CCNC: 19 U/L
BILIRUB SERPL-MCNC: 0.3 MG/DL
BUN SERPL-MCNC: 15 MG/DL
CALCIUM SERPL-MCNC: 9.3 MG/DL
CHLORIDE SERPL-SCNC: 104 MMOL/L
CO2 SERPL-SCNC: 25 MMOL/L
CREAT SERPL-MCNC: 0.76 MG/DL
EGFR: 130 ML/MIN/1.73M2
ESTRADIOL SERPL-MCNC: 264 PG/ML
FSH SERPL-MCNC: <0.3 IU/L
GLUCOSE SERPL-MCNC: 83 MG/DL
LH SERPL-ACNC: <0.3 IU/L
POTASSIUM SERPL-SCNC: 4.3 MMOL/L
PROT SERPL-MCNC: 7.1 G/DL
SODIUM SERPL-SCNC: 138 MMOL/L
TESTOST SERPL-MCNC: 10.5 NG/DL

## 2022-08-12 ENCOUNTER — RX RENEWAL (OUTPATIENT)
Age: 22
End: 2022-08-12

## 2022-08-23 ENCOUNTER — APPOINTMENT (OUTPATIENT)
Dept: TRANSGENDER CARE | Facility: CLINIC | Age: 22
End: 2022-08-23

## 2022-08-25 ENCOUNTER — APPOINTMENT (OUTPATIENT)
Dept: FAMILY MEDICINE | Facility: CLINIC | Age: 22
End: 2022-08-25

## 2022-08-25 ENCOUNTER — NON-APPOINTMENT (OUTPATIENT)
Age: 22
End: 2022-08-25

## 2022-08-25 VITALS
DIASTOLIC BLOOD PRESSURE: 64 MMHG | SYSTOLIC BLOOD PRESSURE: 110 MMHG | OXYGEN SATURATION: 97 % | HEART RATE: 93 BPM | WEIGHT: 193 LBS | BODY MASS INDEX: 27.02 KG/M2 | HEIGHT: 71 IN

## 2022-08-25 PROCEDURE — 99214 OFFICE O/P EST MOD 30 MIN: CPT

## 2022-08-25 RX ORDER — IBUPROFEN 600 MG/1
600 TABLET, FILM COATED ORAL 3 TIMES DAILY
Qty: 1 | Refills: 1 | Status: ACTIVE | COMMUNITY
Start: 2022-08-25 | End: 1900-01-01

## 2022-08-29 NOTE — PHYSICAL EXAM
[No Acute Distress] : no acute distress [Normal Sclera/Conjunctiva] : normal sclera/conjunctiva [No JVD] : no jugular venous distention [No Respiratory Distress] : no respiratory distress  [Normal Rate] : normal rate  [No Focal Deficits] : no focal deficits [Speech Grossly Normal] : speech grossly normal [Alert and Oriented x3] : oriented to person, place, and time [Normal Mood] : the mood was normal [Well Developed] : well developed [No Accessory Muscle Use] : no accessory muscle use [Regular Rhythm] : with a regular rhythm [de-identified] : calm and engaging   long blod hair  shaved  face  [de-identified] : FERCHO [de-identified] : FROM Left shoulder   strength intact

## 2022-08-29 NOTE — ASSESSMENT
[FreeTextEntry1] : Acute encounter for 21 year old WTGF with PMH as stated in HPI / active list. \par \par Management : \par \par See HPI and Plan.\par \par Ibuprofen regimen to be initiated.  Advised one tablet tid with food for 3-5 days ; if improving continue for up to 7 days. \par \par Advised to not engage in push ups or additional physical labor.  REST UE for one week. \par \par Advised moist heat alternating with ice to area for relief.  \par \par Advised to make appt for CPE. \par \par Best wishes offered!

## 2022-08-29 NOTE — REVIEW OF SYSTEMS
[Muscle Pain] : muscle pain [Negative] : Neurological [Anxiety] : no anxiety [Depression] : no depression [FreeTextEntry3] : eyeglasses [FreeTextEntry9] : upper back pain, left scapula region, see HPI [de-identified] : DENIES

## 2022-08-29 NOTE — ADDENDUM
[FreeTextEntry1] : Medical record entries made by the scribe today, were at my direction and personally dictated to them by me, Dr. Alexsandra Pelayo on 08/25/2022.  I have reviewed the chart and agree that the record accurately reflects my personal performance of the history, physical exam, assessment and plan.\par \par Vignesh CARBONE acting as scribe for Dr. Alexsandra Pelayo MD on 08/25/2022 at 4:54 PM.\par

## 2022-08-29 NOTE — HISTORY OF PRESENT ILLNESS
[FreeTextEntry8] : JIMMIE REYES is a 21 year old M who presents today for acute back pain.  \par Pt states she is experiencing pain in upper left portion near scapula, states Advil and Tylenol for relief, "maybe once a day" regarding medication.  Pt states that pain has persisted daily, "will flare up for an hour or two."  \par \par Pt states no MVA, no SHADE, "just bad posture." \par \par Pt is the child of Vijaya Barros.\par \par Continues to follow up with Endo regarding transition.

## 2022-09-05 ENCOUNTER — RX RENEWAL (OUTPATIENT)
Age: 22
End: 2022-09-05

## 2022-09-05 RX ORDER — SYRINGE WITH NEEDLE, 1 ML 27GX1/2"
25G X 1" SYRINGE, EMPTY DISPOSABLE MISCELLANEOUS
Qty: 1 | Refills: 3 | Status: ACTIVE | COMMUNITY
Start: 2022-08-12 | End: 1900-01-01

## 2022-09-06 ENCOUNTER — APPOINTMENT (OUTPATIENT)
Dept: TRANSGENDER CARE | Facility: CLINIC | Age: 22
End: 2022-09-06

## 2022-09-06 VITALS
TEMPERATURE: 97.9 F | WEIGHT: 196 LBS | BODY MASS INDEX: 27.44 KG/M2 | HEART RATE: 97 BPM | DIASTOLIC BLOOD PRESSURE: 68 MMHG | OXYGEN SATURATION: 99 % | SYSTOLIC BLOOD PRESSURE: 112 MMHG | HEIGHT: 71 IN

## 2022-09-06 PROCEDURE — 99214 OFFICE O/P EST MOD 30 MIN: CPT

## 2022-09-06 RX ORDER — TRETINOIN 0.25 MG/G
0.03 CREAM TOPICAL
Qty: 45 | Refills: 0 | Status: ACTIVE | COMMUNITY
Start: 2022-04-13

## 2022-09-06 NOTE — REVIEW OF SYSTEMS
[Recent Weight Gain (___ Lbs)] : recent weight gain: [unfilled] lbs [All other systems negative] : All other systems negative [Fatigue] : no fatigue [Recent Weight Loss (___ Lbs)] : no recent weight loss [Visual Field Defect] : no visual field defect [Dysphagia] : no dysphagia [Dysphonia] : no dysphonia [Chest Pain] : no chest pain [Palpitations] : no palpitations [Shortness Of Breath] : no shortness of breath [Nausea] : no nausea [Vomiting] : no vomiting [Polyuria] : no polyuria [Joint Pain] : no joint pain [Headaches] : no headaches [Tremors] : no tremors [Cold Intolerance] : no cold intolerance [Heat Intolerance] : no heat intolerance

## 2022-09-06 NOTE — DATA REVIEWED
[FreeTextEntry1] : Labs 4/2022:\par CMP normal\par Estradiol 264\par Testosterone 10.5\par LH <0.3\par FSH <0.3\par \par Labs 11/2021:\par Estradiol 114\par Testosterone 9.6\par CMP normal\par \par Labs 9/2021:\par Testosterone 9.4\par Estradiol 77\par Prolactin 14\par Lipid Profile normal\par \par Labs 4/2021:\par CBC normal\par A1c 5.2%\par Estradiol 11\par Testosterone 559\par SHBG 22.5\par FSH 6.1\par LH 6.8\par Prolactin 13.2\par CMP normal\par Lipid Profile at goal

## 2022-09-06 NOTE — ASSESSMENT
[FreeTextEntry1] : 20 y/o transfemale here for follow-up hormone affirming transition therapy. Discussed the feminization process involving estrogen with spironolactone. Discussed physical changes such as breast growth, softer skin, body fat redistribution, less erections etc with timeline ranging from 3-6 months initial onset to maximal benefits in 1-2 years. Discussed potential risks of transaminitis, CAD and thromboembolic disease especially with tobacco use. Also need to monitor for risk of breast cancer given BRCA +. I discussed with the patient at length the limited data on risk of breast cancer with hormone affirming therapy especially with strong risk factors such as this. Pt. aware and consents to estrogen. Will need to monitor BP, LFTs, and potassium on hormones. No plans for top or bottom surgery at this time. Psych and mental health follow-up recommended. Will need age-appropriate health care and cancer screening maintenance over the years with testicular exam, mammogram or US, colonoscopy, prostate exam. Pt. not interested in sperm banking. Started estradiol 1 mg BID and spironolactone 100 mg BID. Now on estradiol 0.2 ml (4 mg) IM weekly and spironolactone 100 mg BID with dose adjustment as needed based on levels today. \par \par Prep time with previsit notes, review of labs and interval progress notes and consultations \par Discussion with patient regarding new hormone regimen with injectable estrogen with management plan, risks and benefits, treatment options and goals of care answering all patients questions and addressing all concerns \par Post-visit completion, consultation, charting and review \par Total Time 30 min\par \par Specifically causes, evaluation, treatment options, risks, complications, and benefits of available therapies were discussed. Questions were answered.\par \par The submitted E/M billing level for this visit reflects the total time spent on the day of the visit including face-to-face time spent with the patient, non-face-to-face review of medical records and relevant information, documentation, and asynchronous communication with the patient after a visit via phone, email, or patient’s EHR portal after the visit. \par The medical records reviewed are either scanned into the chart or reviewed with the patient using a patient’s electronic medical records portal for patients with records not available to [facility] via electronic transmission platforms from other institutions and labs. \par Time spend counseling and performing coordination of care was also included in determining the appropriate EM billing level.\par \par I have reviewed and verified information regarding the chief complaint and history recorded by the ancillary staff and/or the patient. I have independently reviewed and interpreted tests performed by other physicians and facilities as necessary. \par \par I have discussed with the patient differential diagnosis, reason for auxiliary tests if ordered, risks, benefits, alternatives, and complications of each form of therapy were discussed.

## 2022-09-06 NOTE — HISTORY OF PRESENT ILLNESS
[FreeTextEntry1] : 21 year old, transmale here for hormone affirming therapy\par \par Preferred Pronouns: she/her\par Sexual orientation: heterosexual. \par Gender identity: \par Assigned male at birth\par Call pt: Brian\par • Name Change + Gender Marker: Not a priority. Still exploring. \par Cleared for hormones by Pearl Perry\par \par \par Coming out/Family support:\par • Transition HX + Present Life: Brian tells having qualms about his assigned gender at birth since around 2010 but did not focus too much on it. In June/July 2020 he began seriously researching this issue more. He recalls, speaking to a therapist once about his gender identity, but it did not go beyond that. In 2020 he started participating in online trans forums and had an “epiphany” that he is female. He came out to his family and friends. His family (mom/step dad) is working on understanding but overall, he feels safe and supported at home. He lives with his mother, stepfather, and younger brother. He maintains a good relationship with his biological father, and he is supportive but he's still a bit confused. Pt has a good relationship with his brother and older sister (does not live home). \par \par \par • Mental Health: Reports anxiety and gender dysphoria. Was in care with Pearl Perry [facility] ([phone]). Now seeing new therapist. He attends weekly in person sessions. No psychiatric admissions. No psychiatric medications. No sleeping problems. No SI or plans to self-harm. No hx of violence. Not interested in virtual support resources. \par \par • Endocrinology: Started on gender affirming hormone therapy 5/2021. Recommended estradiol 1mg BID and spironolactone 100mg BID. Changed to injectable estrogen 7/2021 as level remained suboptimal and patient was not happy with progression of feminization. Currently on 0.2ml of 20mg/ml every week on Fridays. Last dose 10 days ago as she missed her last injection. Since starting hormone has noticed less spontaneous erections, softer skin, some chest/nipple tenderness. Interested in full feminization. Mostly wants improved mood and body fat redistribution. No genital dysphoria. Has gender dysphoria with overall appearance. Breasts are important, as well as the physique and hair/makeup/outfit. Pt feels that estrogen will be very important to him.\par Not tucking\par Not interested in sperm banking.\par No erectile dysfunction. Not sexually active. \par No interest in gender affirming surgeries at this time. Maybe bottom surgery in the future. \par No tobacco use\par No headaches, changes in vision, nausea, vomiting, chest pain, SOB, palpitations, LE swelling or calf pain\par No hx of thromboembolic disease or liver disorders. \par Depression and anxiety stable. No suicidal ideations or plans. \par \par Genetic Counseling: Shared his mother had breast cancer and he inherited the BRCA2 gene. In care with a genetic counselor, Kranthi Izaguirre ([phone]), [facility], Cancer Genetics. \par Mom - Breast cancer age 51 y/o -

## 2022-11-06 ENCOUNTER — NON-APPOINTMENT (OUTPATIENT)
Age: 22
End: 2022-11-06

## 2023-01-03 ENCOUNTER — APPOINTMENT (OUTPATIENT)
Dept: FAMILY MEDICINE | Facility: CLINIC | Age: 23
End: 2023-01-03

## 2023-03-03 ENCOUNTER — RX RENEWAL (OUTPATIENT)
Age: 23
End: 2023-03-03

## 2023-07-25 ENCOUNTER — APPOINTMENT (OUTPATIENT)
Dept: TRANSGENDER CARE | Facility: CLINIC | Age: 23
End: 2023-07-25

## 2023-08-08 ENCOUNTER — APPOINTMENT (OUTPATIENT)
Dept: TRANSGENDER CARE | Facility: CLINIC | Age: 23
End: 2023-08-08

## 2023-09-19 ENCOUNTER — APPOINTMENT (OUTPATIENT)
Dept: TRANSGENDER CARE | Facility: CLINIC | Age: 23
End: 2023-09-19
Payer: COMMERCIAL

## 2023-09-19 VITALS
TEMPERATURE: 98 F | DIASTOLIC BLOOD PRESSURE: 70 MMHG | HEART RATE: 89 BPM | WEIGHT: 230 LBS | HEIGHT: 71 IN | BODY MASS INDEX: 32.2 KG/M2 | OXYGEN SATURATION: 99 % | SYSTOLIC BLOOD PRESSURE: 110 MMHG

## 2023-09-19 PROCEDURE — 99214 OFFICE O/P EST MOD 30 MIN: CPT

## 2023-09-22 LAB
ALBUMIN SERPL ELPH-MCNC: 4.6 G/DL
ALP BLD-CCNC: 49 U/L
ALT SERPL-CCNC: 38 U/L
ANION GAP SERPL CALC-SCNC: 12 MMOL/L
AST SERPL-CCNC: 21 U/L
BILIRUB SERPL-MCNC: 0.2 MG/DL
BUN SERPL-MCNC: 13 MG/DL
CALCIUM SERPL-MCNC: 9.2 MG/DL
CHLORIDE SERPL-SCNC: 104 MMOL/L
CHOLEST SERPL-MCNC: 120 MG/DL
CO2 SERPL-SCNC: 24 MMOL/L
CREAT SERPL-MCNC: 0.85 MG/DL
EGFR: 124 ML/MIN/1.73M2
ESTIMATED AVERAGE GLUCOSE: 108 MG/DL
ESTRADIOL SERPL-MCNC: 36 PG/ML
GLUCOSE SERPL-MCNC: 86 MG/DL
HBA1C MFR BLD HPLC: 5.4 %
HDLC SERPL-MCNC: 37 MG/DL
LDLC SERPL CALC-MCNC: 55 MG/DL
NONHDLC SERPL-MCNC: 82 MG/DL
POTASSIUM SERPL-SCNC: 4.5 MMOL/L
PROT SERPL-MCNC: 7 G/DL
SODIUM SERPL-SCNC: 140 MMOL/L
TESTOST SERPL-MCNC: 421 NG/DL
TRIGL SERPL-MCNC: 160 MG/DL

## 2023-10-10 ENCOUNTER — APPOINTMENT (OUTPATIENT)
Dept: HUMAN REPRODUCTION | Facility: CLINIC | Age: 23
End: 2023-10-10
Payer: COMMERCIAL

## 2023-10-10 PROCEDURE — 89322 SEMEN ANAL STRICT CRITERIA: CPT

## 2023-12-20 ENCOUNTER — APPOINTMENT (OUTPATIENT)
Dept: ORTHOPEDIC SURGERY | Facility: CLINIC | Age: 23
End: 2023-12-20
Payer: COMMERCIAL

## 2023-12-20 DIAGNOSIS — M75.52 BURSITIS OF LEFT SHOULDER: ICD-10-CM

## 2023-12-20 DIAGNOSIS — M89.8X1 OTHER SPECIFIED DISORDERS OF BONE, SHOULDER: ICD-10-CM

## 2023-12-20 DIAGNOSIS — G89.29 OTHER SPECIFIED DISORDERS OF BONE, SHOULDER: ICD-10-CM

## 2023-12-20 PROCEDURE — 99204 OFFICE O/P NEW MOD 45 MIN: CPT

## 2023-12-20 PROCEDURE — 73030 X-RAY EXAM OF SHOULDER: CPT | Mod: LT

## 2023-12-20 NOTE — PHYSICAL EXAM
[Left] : left shoulder [There are no fractures, subluxations or dislocations. No significant abnormalities are seen] : There are no fractures, subluxations or dislocations. No significant abnormalities are seen [Type 2 acromion] : Type 2 acromion [de-identified] : Constitutional: The general appearance of the patient is well developed, well nourished, no deformities and well groomed. Normal  Gait: Gait and function is as follows: normal gait.  Skin: Head and neck visualized skin is normal. Left upper extremity visualized skin is normal. Right upper extremity visualized skin is normal. Thoracic Skin of the thoracic spine shows visualized skin is normal.  Cardiovascular: palpable radial pulse bilaterally, good capillary refill in digits of the bilateral upper extremities and no temperature or color changes in the bilateral upper extremities.  Lymphatic: Normal Palpation of lymph nodes in the cervical.  Neurologic: fine motor control in the bilateral upper extremities is intact. Deep Tendon Reflexes in Upper and Lower Extremities Negative Javed's in the bilateral upper extremities. The patient is oriented to time, place and person. Sensation to light touch intact in the bilateral upper extremities. Mood and Affect is normal.  Right Shoulder: Inspection of the shoulder/upper arm is as follows: There is a full, pain-free, stable range of motion of the shoulder with normal strength and no tenderness to palpation.  Left Shoulder: Inspection of the shoulder/upper arm is as follows: no scapula winging, no biceps deformity and no AC joint deformity. Palpation of the shoulder/upper arm is as follows: There is tenderness at the proximal biceps tendon. Range of motion of the shoulder is as follows: Pain with internal rotation, external rotation, abduction and forward flexion. Strength of the shoulder is as follows: Supraspinatus 4/5. External Rotation 4/5. Internal Rotation 4/5. Deltoid 5/5 Ligament Stability and Special Tests of the shoulder is as follows: Neer test is positive. Brooks' test is positive. Speed's test is positive.  Neck:  Inspection / Palpation of the cervical spine is as follows: mild paracervical tenderness. Range of motion of the cervical spine is as follows: moderately decreased range of motion of the cervical spine. Stability testing for the cervical spine is as follows Stable range of motion.  Back, including spine: Inspection / Palpation of the thoracic/lumbar spine is as follows: There is a full, pain free, stable range of motion of the thoracic spine with a normal tone and not tenderness to palpation..

## 2023-12-20 NOTE — HISTORY OF PRESENT ILLNESS
[de-identified] : This is a 24yo  presenting to the office c/o ongoing left shoulder pain for 8 to 9 months. Pain is described as constant, severe in quality. Currently pain is 7/10 and non-radiating. Patient reports pain has been getting progressively worse. Pain is worse at night. Overall pain does improve with rest and ice. Patient denies any numbness or tingling.

## 2023-12-20 NOTE — DISCUSSION/SUMMARY
[de-identified] : This is a 24yo presenting to the office c/o ongoing left shoulder pain for 8 to 9 months.  Xrays are non diagnostic  Physical therapy script given  Exam consistent with scapular insufficiency and impingement. If pain doesn't improve consider MRI arthrogram  Follow up as needed    (1) We discussed a comprehensive treatment plans that included a prescription management plan involving the use of prescription strength medications to include Ibuprofen 600-800 mg TID, versus 500-650 mg Tylenol. We also discussed prescribing topical diclofenac (Voltaren gel) as well as once daily Meloxicam 15 mg. (2) The patient has More Than One chronic injuries/illnesses as outlined, discussed, and documented by ICD 10 codes listed, as well as the HPI and Plan section. There is a moderate risk of morbidity with further treatment, especially from use of prescription strength medications and possible side effects of these medications which include upset stomach and cardiac/renal issues with long term use were discussed. (3) I recommended that the patient follow-up with their medical physician to discuss any significant specific potential issues with long term use such as interactions with current medications or with exacerbation of underlying medical morbidities.   Attestation: I, Echo Blanc , attest that this documentation has been prepared under the direction and in the presence of Provider Valentin Schrader MD. The documentation recorded by the scribe, in my presence, accurately reflects the service I personally performed, and the decisions made by me with my edits as appropriate. Valentin Schrader MD

## 2024-01-11 ENCOUNTER — APPOINTMENT (OUTPATIENT)
Dept: FAMILY MEDICINE | Facility: CLINIC | Age: 24
End: 2024-01-11

## 2024-04-09 ENCOUNTER — APPOINTMENT (OUTPATIENT)
Dept: TRANSGENDER CARE | Facility: CLINIC | Age: 24
End: 2024-04-09
Payer: COMMERCIAL

## 2024-04-09 VITALS
HEART RATE: 83 BPM | WEIGHT: 232 LBS | HEIGHT: 71 IN | OXYGEN SATURATION: 97 % | BODY MASS INDEX: 32.48 KG/M2 | DIASTOLIC BLOOD PRESSURE: 74 MMHG | SYSTOLIC BLOOD PRESSURE: 118 MMHG | TEMPERATURE: 98.2 F

## 2024-04-09 DIAGNOSIS — F64.0 TRANSSEXUALISM: ICD-10-CM

## 2024-04-09 PROCEDURE — 99214 OFFICE O/P EST MOD 30 MIN: CPT

## 2024-04-09 RX ORDER — NEEDLES, DISPOSABLE 25GX5/8"
23G X 1" NEEDLE, DISPOSABLE MISCELLANEOUS
Qty: 1 | Refills: 5 | Status: ACTIVE | COMMUNITY
Start: 2021-07-22 | End: 1900-01-01

## 2024-04-09 RX ORDER — SYRINGE, DISPOSABLE, 1 ML
1 ML SYRINGE, EMPTY DISPOSABLE MISCELLANEOUS
Qty: 90 | Refills: 3 | Status: ACTIVE | COMMUNITY
Start: 2021-07-22 | End: 1900-01-01

## 2024-04-09 RX ORDER — SPIRONOLACTONE 100 MG/1
100 TABLET ORAL
Qty: 180 | Refills: 3 | Status: ACTIVE | COMMUNITY
Start: 2021-05-27 | End: 1900-01-01

## 2024-04-09 RX ORDER — SYRINGE WITH NEEDLE, 1 ML 25GX5/8"
25G X 1" SYRINGE, EMPTY DISPOSABLE MISCELLANEOUS
Qty: 1 | Refills: 5 | Status: ACTIVE | COMMUNITY
Start: 2022-08-12 | End: 1900-01-01

## 2024-04-09 NOTE — ASSESSMENT
[FreeTextEntry1] : 22 y/o transfemale here for follow-up hormone affirming transition therapy. Discussed the feminization process involving estrogen with spironolactone. Discussed physical changes such as breast growth, softer skin, body fat redistribution, less erections etc with timeline ranging from 3-6 months initial onset to maximal benefits in 1-2 years. Discussed potential risks of transaminitis, CAD and thromboembolic disease especially with tobacco use. Also need to monitor for risk of breast cancer given BRCA +. I discussed with the patient at length the limited data on risk of breast cancer with hormone affirming therapy especially with strong risk factors such as this. Pt. aware and consents to estrogen. Will need to monitor BP, LFTs, and potassium on hormones. No plans for top or bottom surgery at this time. Psych and mental health follow-up recommended. Will need age-appropriate health care and cancer screening maintenance over the years with testicular exam, mammogram or US, colonoscopy, prostate exam. Pt. now interested in sperm banking and off feminizing hormone therapy. Will obtain semen analysis to assess quality of sperm prior to banking. Once completed can resume estradiol 0.2 ml (4 mg) IM weekly and spironolactone 100 mg BID with dose adjustment as needed.  Prep time with previsit notes, review of labs and interval progress notes and consultations  Discussion with patient regarding new hormone regimen with injectable estrogen with management plan, risks and benefits, treatment options and goals of care answering all patients questions and addressing all concerns  Post-visit completion, consultation, charting and review  Total Time 30 min  Specifically causes, evaluation, treatment options, risks, complications, and benefits of available therapies were discussed. Questions were answered.  The submitted E/M billing level for this visit reflects the total time spent on the day of the visit including face-to-face time spent with the patient, non-face-to-face review of medical records and relevant information, documentation, and asynchronous communication with the patient after a visit via phone, email, or patient's EHR portal after the visit.  The medical records reviewed are either scanned into the chart or reviewed with the patient using a patient's electronic medical records portal for patients with records not available to [facility] via electronic transmission platforms from other institutions and labs.  Time spend counseling and performing coordination of care was also included in determining the appropriate EM billing level.  I have reviewed and verified information regarding the chief complaint and history recorded by the ancillary staff and/or the patient. I have independently reviewed and interpreted tests performed by other physicians and facilities as necessary.   I have discussed with the patient differential diagnosis, reason for auxiliary tests if ordered, risks, benefits, alternatives, and complications of each form of therapy were discussed.

## 2024-04-09 NOTE — HISTORY OF PRESENT ILLNESS
[FreeTextEntry1] : 23 year old, transmale here for hormone affirming therapy  Preferred Pronouns: she/her Sexual orientation: heterosexual.  Gender identity:  Assigned male at birth Call pt: Brian - Name Change + Gender Marker: In the process Cleared for hormones by Pearl Perry   Coming out/Family support: - Transition HX + Present Life: Brian tells having qualms about his assigned gender at birth since around 2010 but did not focus too much on it. In June/July 2020 he began seriously researching this issue more. He recalls, speaking to a therapist once about his gender identity, but it did not go beyond that. In 2020 he started participating in online trans forums and had an "epiphany" that he is female. He came out to his family and friends. His family (mom/step dad) is working on understanding but overall, he feels safe and supported at home. He lives with his mother, stepfather, and younger brother. He maintains a good relationship with his biological father, and he is supportive but he's still a bit confused. Pt has a good relationship with his brother and older sister (does not live home).    - Mental Health: Reports anxiety and gender dysphoria. Was in care with Pearl Perry [facility] ([phone]). Now seeing new therapist. He attends weekly in person sessions. No psychiatric admissions. No psychiatric medications. No sleeping problems. No SI or plans to self-harm. No hx of violence. Not interested in virtual support resources.   - Endocrinology: Started on gender affirming hormone therapy 5/2021. Recommended estradiol 1mg BID and spironolactone 100mg BID. Changed to injectable estrogen 7/2021 as level remained suboptimal and patient was not happy with progression of feminization. Was on 0.2ml of 20mg/ml every week on Fridays. Stopped the medication 8/2023 for plans for sperm banking. Off the estrogen has noticed change to body odor. Also with spontaneous erections and increased ejaculation. Now sperm banking completed so plans to restart hormones. When on estrogen noticed less spontaneous erections, softer skin, some chest/nipple tenderness. Interested in full feminization. Mostly wants improved mood and body fat redistribution. No genital dysphoria. Has gender dysphoria with overall appearance. Breasts are important, as well as the physique and hair/makeup/outfit. Pt feels that estrogen will be very important to him. Not tucking Now interested in sperm banking. No erectile dysfunction. Not sexually active.  No interest in gender affirming surgeries at this time. Maybe bottom surgery in the future.  No tobacco use No headaches, changes in vision, nausea, vomiting, chest pain, SOB, palpitations, LE swelling or calf pain No hx of thromboembolic disease or liver disorders.  Depression and anxiety stable. No suicidal ideations or plans.   Genetic Counseling: Shared his mother had breast cancer and he inherited the BRCA2 gene. In care with a genetic counselor, Kranthi Izaguirre ([phone]), [facility], Cancer Genetics.  Mom - Breast cancer age 53 y/o -

## 2024-04-09 NOTE — REVIEW OF SYSTEMS
11LM Physical Therapy: Orders received for PT evaluation and treatment. Per OT triage, PT required for skilled evaluation. Patient currently off the floor for test, will continue therapy efforts.   [All other systems negative] : All other systems negative [Fatigue] : no fatigue [Recent Weight Gain (___ Lbs)] : no recent weight gain [Recent Weight Loss (___ Lbs)] : no recent weight loss [Visual Field Defect] : no visual field defect [Dysphagia] : no dysphagia [Dysphonia] : no dysphonia [Chest Pain] : no chest pain [Palpitations] : no palpitations [Shortness Of Breath] : no shortness of breath [Nausea] : no nausea [Vomiting] : no vomiting [Polyuria] : no polyuria [Joint Pain] : no joint pain [Headaches] : no headaches [Tremors] : no tremors [Cold Intolerance] : no cold intolerance [Heat Intolerance] : no heat intolerance

## 2024-04-09 NOTE — DATA REVIEWED
[FreeTextEntry1] : Labs 9/2023: Estradiol 36 Testosterone 421  Chol 120 HDL 37 LDL 55 CMP normal A1c 5.4%  Labs 4/2022: CMP normal Estradiol 264 Testosterone 10.5 LH <0.3 FSH <0.3  Labs 11/2021: Estradiol 114 Testosterone 9.6 CMP normal  Labs 9/2021: Testosterone 9.4 Estradiol 77 Prolactin 14 Lipid Profile normal  Labs 4/2021: CBC normal A1c 5.2% Estradiol 11 Testosterone 559 SHBG 22.5 FSH 6.1 LH 6.8 Prolactin 13.2 CMP normal Lipid Profile at goal

## 2024-05-06 RX ORDER — ESTRADIOL VALERATE 20 MG/ML
20 INJECTION INTRAMUSCULAR
Qty: 5 | Refills: 3 | Status: ACTIVE | COMMUNITY
Start: 2021-07-22 | End: 1900-01-01

## 2024-07-09 ENCOUNTER — APPOINTMENT (OUTPATIENT)
Dept: TRANSGENDER CARE | Facility: CLINIC | Age: 24
End: 2024-07-09

## 2025-02-13 ENCOUNTER — APPOINTMENT (OUTPATIENT)
Dept: ORTHOPEDIC SURGERY | Facility: CLINIC | Age: 25
End: 2025-02-13
Payer: COMMERCIAL

## 2025-02-13 DIAGNOSIS — M25.851 OTHER SPECIFIED JOINT DISORDERS, RIGHT HIP: ICD-10-CM

## 2025-02-13 DIAGNOSIS — M25.751 OTHER SPECIFIED JOINT DISORDERS, RIGHT HIP: ICD-10-CM

## 2025-02-13 PROCEDURE — 73503 X-RAY EXAM HIP UNI 4/> VIEWS: CPT | Mod: RT

## 2025-02-13 PROCEDURE — 99213 OFFICE O/P EST LOW 20 MIN: CPT

## 2025-02-20 ENCOUNTER — APPOINTMENT (OUTPATIENT)
Dept: MRI IMAGING | Facility: CLINIC | Age: 25
End: 2025-02-20

## 2025-03-04 ENCOUNTER — APPOINTMENT (OUTPATIENT)
Dept: TRANSGENDER CARE | Facility: CLINIC | Age: 25
End: 2025-03-04
Payer: COMMERCIAL

## 2025-03-04 VITALS
HEIGHT: 71 IN | DIASTOLIC BLOOD PRESSURE: 74 MMHG | TEMPERATURE: 96.3 F | OXYGEN SATURATION: 96 % | WEIGHT: 196 LBS | BODY MASS INDEX: 27.44 KG/M2 | SYSTOLIC BLOOD PRESSURE: 124 MMHG | HEART RATE: 97 BPM

## 2025-03-04 DIAGNOSIS — F64.0 TRANSSEXUALISM: ICD-10-CM

## 2025-03-04 PROCEDURE — 99214 OFFICE O/P EST MOD 30 MIN: CPT

## 2025-03-05 LAB
ESTIMATED AVERAGE GLUCOSE: 108 MG/DL
HBA1C MFR BLD HPLC: 5.4 %

## 2025-03-06 LAB
ALBUMIN SERPL ELPH-MCNC: 4.9 G/DL
ALP BLD-CCNC: 61 U/L
ALT SERPL-CCNC: 30 U/L
ANION GAP SERPL CALC-SCNC: 14 MMOL/L
AST SERPL-CCNC: 23 U/L
BILIRUB SERPL-MCNC: 0.4 MG/DL
BUN SERPL-MCNC: 12 MG/DL
CALCIUM SERPL-MCNC: 10.1 MG/DL
CHLORIDE SERPL-SCNC: 100 MMOL/L
CHOLEST SERPL-MCNC: 153 MG/DL
CO2 SERPL-SCNC: 24 MMOL/L
CREAT SERPL-MCNC: 0.89 MG/DL
EGFRCR SERPLBLD CKD-EPI 2021: 121 ML/MIN/1.73M2
ESTRADIOL SERPL-MCNC: 176 PG/ML
FSH SERPL-MCNC: <0.3 IU/L
GLUCOSE SERPL-MCNC: 89 MG/DL
HDLC SERPL-MCNC: 68 MG/DL
LDLC SERPL CALC-MCNC: 76 MG/DL
LH SERPL-ACNC: <0.3 IU/L
NONHDLC SERPL-MCNC: 86 MG/DL
POTASSIUM SERPL-SCNC: 4.5 MMOL/L
PROT SERPL-MCNC: 7.8 G/DL
SODIUM SERPL-SCNC: 137 MMOL/L
TESTOST SERPL-MCNC: 32.5 NG/DL
TRIGL SERPL-MCNC: 45 MG/DL
TSH SERPL-ACNC: 0.82 UIU/ML

## 2025-08-21 ENCOUNTER — APPOINTMENT (OUTPATIENT)
Dept: ORTHOPEDIC SURGERY | Facility: CLINIC | Age: 25
End: 2025-08-21

## 2025-09-02 ENCOUNTER — APPOINTMENT (OUTPATIENT)
Dept: TRANSGENDER CARE | Facility: CLINIC | Age: 25
End: 2025-09-02